# Patient Record
Sex: FEMALE | Race: WHITE | Employment: FULL TIME | URBAN - METROPOLITAN AREA
[De-identification: names, ages, dates, MRNs, and addresses within clinical notes are randomized per-mention and may not be internally consistent; named-entity substitution may affect disease eponyms.]

---

## 2017-08-16 ENCOUNTER — OFFICE VISIT (OUTPATIENT)
Dept: PEDIATRICS | Facility: CLINIC | Age: 32
End: 2017-08-16
Payer: COMMERCIAL

## 2017-08-16 VITALS
HEIGHT: 67 IN | SYSTOLIC BLOOD PRESSURE: 86 MMHG | HEART RATE: 80 BPM | OXYGEN SATURATION: 100 % | TEMPERATURE: 97 F | DIASTOLIC BLOOD PRESSURE: 50 MMHG | WEIGHT: 120.6 LBS | BODY MASS INDEX: 18.93 KG/M2

## 2017-08-16 DIAGNOSIS — Z32.01 PREGNANCY TEST POSITIVE: ICD-10-CM

## 2017-08-16 DIAGNOSIS — N91.2 ABSENCE OF MENSTRUATION: Primary | ICD-10-CM

## 2017-08-16 LAB — BETA HCG QUAL IFA URINE: POSITIVE

## 2017-08-16 PROCEDURE — 99213 OFFICE O/P EST LOW 20 MIN: CPT | Performed by: FAMILY MEDICINE

## 2017-08-16 PROCEDURE — 84703 CHORIONIC GONADOTROPIN ASSAY: CPT | Performed by: FAMILY MEDICINE

## 2017-08-16 ASSESSMENT — PAIN SCALES - GENERAL: PAINLEVEL: NO PAIN (0)

## 2017-08-16 NOTE — NURSING NOTE
"Chief Complaint   Patient presents with     Confirmation Of Pregnancy       Initial BP (!) 86/50  Pulse 80  Temp 97  F (36.1  C) (Oral)  Ht 5' 6.75\" (1.695 m)  Wt 120 lb 9.6 oz (54.7 kg)  LMP 06/03/2017 (Exact Date)  SpO2 100%  BMI 19.03 kg/m2 Estimated body mass index is 19.03 kg/(m^2) as calculated from the following:    Height as of this encounter: 5' 6.75\" (1.695 m).    Weight as of this encounter: 120 lb 9.6 oz (54.7 kg).  BP completed using cuff size: marzena Lorenzana CMA    "

## 2017-08-16 NOTE — MR AVS SNAPSHOT
After Visit Summary   8/16/2017    Marcy Diaz    MRN: [id number]           Patient Information     Date Of Birth          1985        Visit Information        Provider Department      8/16/2017 6:20 PM Terra Yeung MD [facility]        Today's Diagnoses     Absence of menstruation    -  1    Pregnancy test positive           Follow-ups after your visit        Who to contact     If you have questions or need follow up information about today's clinic visit or your schedule please contact [facility] directly at [phone].  Normal or non-critical lab and imaging results will be communicated to you by [employer], letter or phone within 4 business days after the clinic has received the results. If you do not hear from us within 7 days, please contact the clinic through [employer] or phone. If you have a critical or abnormal lab result, we will notify you by phone as soon as possible.  Submit refill requests through [employer] or call your pharmacy and they will forward the refill request to us. Please allow 3 business days for your refill to be completed.          Additional Information About Your Visit        MyChart Information     [employer] gives you secure access to your electronic health record. If you see a primary care provider, you can also send messages to your care team and make appointments. If you have questions, please call your primary care clinic.  If you do not have a primary care provider, please call [phone] and they will assist you.      [employer] is an electronic gateway that provides easy, online access to your medical records. With [employer], you can request a clinic appointment, read your test results, renew a prescription or communicate with your care team.     To access your existing account, please contact your [facility] or call [phone] for assistance.        Care EveryWhere ID     This is your Care  "EveryWhere ID. This could be used by other organizations to access your [place] medical records  [id number]        Your Vitals Were     Pulse Temperature Height Last Period Pulse Oximetry BMI (Body Mass Index)    80 97  F (36.1  C) (Oral) 5' 6.75\" (1.695 m) 06/03/2017 (Exact Date) 100% 19.03 kg/m2       Blood Pressure from Last 3 Encounters:   08/16/17 (!) 86/50   12/11/15 106/71   10/30/15 108/69    Weight from Last 3 Encounters:   08/16/17 120 lb 9.6 oz (54.7 kg)   12/11/15 133 lb (60.3 kg)   10/30/15 154 lb (69.9 kg)              We Performed the Following     Beta HCG qual IFA urine        Primary Care Provider Office Phone # Fax #    Neelam Cantrellrula Ahmadi, APRN [phone]       [address]  [place] [address]        Equal Access to Services     [facility]: Hadii aad ku hadasho Soomaali, waaxda luqadaha, qaybta kaalmada adeegyada, waxay idiin hayaan annabelle kramer'harshad . So [facility] [phone].    ATENCIÓN: Si habla español, tiene a bro disposición servicios gratuitos de asistencia lingüística. Llame al [phone].    We comply with applicable federal civil rights laws and [place] laws. We do not discriminate on the basis of race, color, national origin, age, disability sex, sexual orientation or gender identity.            Thank you!     Thank you for choosing [facility]  for your care. Our goal is always to provide you with excellent care. Hearing back from our patients is one way we can continue to improve our services. Please take a few minutes to complete the written survey that you may receive in the mail after your visit with us. Thank you!             Your Updated Medication List - Protect others around you: Learn how to safely use, store and throw away your medicines at www.disposemymeds.org.          This list is accurate as of: 8/16/17  6:38 PM.  Always use your most recent med list.                   Brand Name Dispense Instructions for use " Diagnosis    PRENATAL PO      Take 1 tablet by mouth 3 times daily

## 2017-08-16 NOTE — PROGRESS NOTES
SUBJECTIVE:                                                    Marcy Diaz is a 32 year old female who presents to clinic today for the following health issues:      Confirmation of pregnancy-LMP 6/3/17 and positive home pregnancy test    Patient is  2 para 1, here to confirm pregnancy. LMP-Sindhu 3, 2017.  Has history of regular menstrual cycles  Has no urinary frequency, abdominal pain, breast fullness or tenderness, abnormal vaginal discharge or bleeding, nausea, vomiting.          Problem list and histories reviewed & adjusted, as indicated.  Additional history: as documented    Patient Active Problem List   Diagnosis     Acne     CARDIOVASCULAR SCREENING; LDL GOAL LESS THAN 160     Past Surgical History:   Procedure Laterality Date     EXTRACTION(S) DENTAL       EYE SURGERY   &     strabismus correction       Social History   Substance Use Topics     Smoking status: Never Smoker     Smokeless tobacco: Never Used     Alcohol use Yes      Comment: 2 beers a week, not in PG     Family History   Problem Relation Age of Onset     C.A.D. Maternal Grandfather      DIABETES Maternal Grandfather      Hypertension Maternal Grandfather      Prostate Cancer Maternal Grandfather      C.A.D. Paternal Grandfather      DIABETES Paternal Grandfather      Hypertension Paternal Grandfather      Hypertension Mother      Thyroid Disease Mother      hypothyroidism     Breast Cancer Paternal Grandmother      Alcohol/Drug Father      alcohol     CANCER Father      skin     Asthma No family hx of      CEREBROVASCULAR DISEASE No family hx of      Cancer - colorectal No family hx of      Lipids No family hx of      Depression No family hx of          Current Outpatient Prescriptions   Medication Sig Dispense Refill     Prenatal Vit-Fe Fumarate-FA (PRENATAL PO) Take 1 tablet by mouth 3 times daily        No Known Allergies  Recent Labs   Lab Test  13   1725   LDL  114   HDL  56   TRIG  77      BP Readings from Last  "3 Encounters:   08/16/17 (!) 86/50   12/11/15 106/71   10/30/15 108/69    Wt Readings from Last 3 Encounters:   08/16/17 120 lb 9.6 oz (54.7 kg)   12/11/15 133 lb (60.3 kg)   10/30/15 154 lb (69.9 kg)                  Labs reviewed in EPIC      C: NEGATIVE for fever, chills, change in weight  R: NEGATIVE for significant cough or SOB  CV: NEGATIVE for chest pain, palpitations or peripheral edema  GI: NEGATIVE for nausea, abdominal pain, heartburn, or change in bowel habits  : as above  MUSCULOSKELETAL: NEGATIVE for significant arthralgias or myalgia  NEURO: NEGATIVE for weakness, dizziness or paresthesias  ENDOCRINE: NEGATIVE for temperature intolerance, skin/hair changes  PSYCHIATRIC: NEGATIVE for changes in mood or affect    ROS:      OBJECTIVE:     BP (!) 86/50  Pulse 80  Temp 97  F (36.1  C) (Oral)  Ht 5' 6.75\" (1.695 m)  Wt 120 lb 9.6 oz (54.7 kg)  LMP 06/03/2017 (Exact Date)  SpO2 100%  BMI 19.03 kg/m2  Body mass index is 19.03 kg/(m^2).  GENERAL: healthy, alert and no distress  NECK: no adenopathy, no asymmetry, masses, or scars and thyroid normal to palpation  RESP: lungs clear to auscultation - no rales, rhonchi or wheezes  CV: regular rate and rhythm, normal S1 S2, no S3 or S4, no murmur, click or rub, no peripheral edema and peripheral pulses strong  ABDOMEN: soft, nontender, no hepatosplenomegaly, no masses and bowel sounds normal  MS: no gross musculoskeletal defects noted, no edema  BACK: no CVA tenderness, no paralumbar tenderness  PSYCH: mentation appears normal, affect normal/bright    Diagnostic Test Results:  Results for orders placed or performed in visit on 08/16/17 (from the past 24 hour(s))   Beta HCG qual IFA urine   Result Value Ref Range    Beta HCG Qual IFA Urine Positive (A) NEG^Negative          ASSESSMENT/PLAN:             1. Absence of menstruation    - Beta HCG qual IFA urine    2. Pregnancy test positive  Results for orders placed or performed in visit on 08/16/17   Beta HCG " qual IFA urine   Result Value Ref Range    Beta HCG Qual IFA Urine Positive (A) NEG^Negative         reviewed positive results for urine pregnancy test with patient.  Recommended to continue with prenatal vitamins,   brief prenatal counseling given.  Patient will call to schedule for follow-up with Dr. Almonte for ongoing prenatal care      Chart documentation done in part with Dragon Voice recognition Software. Although reviewed after completion, some word and grammatical error may remain.  Chart forwarded to PCP for FYI     See Patient Instructions    Terra Yeung MD  [facility]

## 2017-08-28 ENCOUNTER — PRENATAL OFFICE VISIT (OUTPATIENT)
Dept: OBGYN | Facility: CLINIC | Age: 32
End: 2017-08-28
Payer: COMMERCIAL

## 2017-08-28 VITALS
HEIGHT: 67 IN | HEART RATE: 74 BPM | TEMPERATURE: 97.8 F | DIASTOLIC BLOOD PRESSURE: 64 MMHG | SYSTOLIC BLOOD PRESSURE: 103 MMHG | BODY MASS INDEX: 19.34 KG/M2 | OXYGEN SATURATION: 100 % | WEIGHT: 123.2 LBS

## 2017-08-28 DIAGNOSIS — Z34.80 PRENATAL CARE, SUBSEQUENT PREGNANCY, UNSPECIFIED TRIMESTER: Primary | ICD-10-CM

## 2017-08-28 LAB
ABO + RH BLD: NORMAL
ABO + RH BLD: NORMAL
BLD GP AB SCN SERPL QL: NORMAL
BLOOD BANK CMNT PATIENT-IMP: NORMAL
ERYTHROCYTE [DISTWIDTH] IN BLOOD BY AUTOMATED COUNT: 13.7 % (ref 10–15)
HCT VFR BLD AUTO: 37.3 % (ref 35–47)
HGB BLD-MCNC: 13 G/DL (ref 11.7–15.7)
MCH RBC QN AUTO: 31.6 PG (ref 26.5–33)
MCHC RBC AUTO-ENTMCNC: 34.9 G/DL (ref 31.5–36.5)
MCV RBC AUTO: 91 FL (ref 78–100)
PLATELET # BLD AUTO: 228 10E9/L (ref 150–450)
RBC # BLD AUTO: 4.12 10E12/L (ref 3.8–5.2)
SPECIMEN EXP DATE BLD: NORMAL
WBC # BLD AUTO: 8.5 10E9/L (ref 4–11)

## 2017-08-28 PROCEDURE — 87086 URINE CULTURE/COLONY COUNT: CPT | Performed by: OBSTETRICS & GYNECOLOGY

## 2017-08-28 PROCEDURE — 86780 TREPONEMA PALLIDUM: CPT | Performed by: OBSTETRICS & GYNECOLOGY

## 2017-08-28 PROCEDURE — 85027 COMPLETE CBC AUTOMATED: CPT | Performed by: OBSTETRICS & GYNECOLOGY

## 2017-08-28 PROCEDURE — 86850 RBC ANTIBODY SCREEN: CPT | Performed by: OBSTETRICS & GYNECOLOGY

## 2017-08-28 PROCEDURE — 86762 RUBELLA ANTIBODY: CPT | Performed by: OBSTETRICS & GYNECOLOGY

## 2017-08-28 PROCEDURE — 99207 ZZC FIRST OB VISIT: CPT | Performed by: OBSTETRICS & GYNECOLOGY

## 2017-08-28 PROCEDURE — 86901 BLOOD TYPING SEROLOGIC RH(D): CPT | Performed by: OBSTETRICS & GYNECOLOGY

## 2017-08-28 PROCEDURE — 36415 COLL VENOUS BLD VENIPUNCTURE: CPT | Performed by: OBSTETRICS & GYNECOLOGY

## 2017-08-28 PROCEDURE — 87340 HEPATITIS B SURFACE AG IA: CPT | Performed by: OBSTETRICS & GYNECOLOGY

## 2017-08-28 PROCEDURE — 87389 HIV-1 AG W/HIV-1&-2 AB AG IA: CPT | Performed by: OBSTETRICS & GYNECOLOGY

## 2017-08-28 PROCEDURE — 86900 BLOOD TYPING SEROLOGIC ABO: CPT | Performed by: OBSTETRICS & GYNECOLOGY

## 2017-08-28 NOTE — NURSING NOTE
"Chief Complaint   Patient presents with     Prenatal Care     1st OB       Initial /64  Pulse 74  Temp 97.8  F (36.6  C) (Oral)  Ht 1.694 m (5' 6.69\")  Wt 55.9 kg (123 lb 3.2 oz)  LMP 06/03/2017 (Exact Date)  SpO2 100%  BMI 19.47 kg/m2 Estimated body mass index is 19.47 kg/(m^2) as calculated from the following:    Height as of this encounter: 1.694 m (5' 6.69\").    Weight as of this encounter: 55.9 kg (123 lb 3.2 oz).  Medication Reconciliation: complete   Cecille Mcdonald CMA      "

## 2017-08-28 NOTE — PROGRESS NOTES
Marcy Diaz is a 32 year old year old G 2 P 1 who presents for an initial obstetrical visit.  Referred by self.    Currently experiencing normal pregnancy symptoms without particular problems including pain, bleeding, marked vomiting or weight loss except: no exceptions.  This was a planned pregnancy. Not nursing and had regular monthly menses.   Here today alone.   Additional concerns: some INGE, previous third degree laceration, paternal age of 50.     ROS:     Systems reviewed include constitutional; breast;                 cardiac; respiratory; gastrointestinal; genitourinary;                                musculoskeletal; integumentary; psychological;                                hematologic/lymphatic and endocrine.                  These systems were negative for significant symptoms except                 for the following: none and see above HPI.    Past medical, obstetrical, surgical, family and social history reviewed and as noted or updated in chart.     Allergies, meds and supplements are as noted or updated in chart.                    Episode OB dating, demographic and history forms completed or reviewed.    EXAM:  VS as noted. BMI- There is no height or weight on file to calculate BMI.    Relatively recent normal general exam- not repeated now.       ASSESSMENT: (Z34.80) Prenatal care, subsequent pregnancy, unspecified trimester  (primary encounter diagnosis)  Comment:   Plan: CBC with Platelets, HIV Antigen Antibody Combo,        Rubella Antibody IgG Quantitative, Hepatitis B         surface antigen, Anti Treponema, ABO/RH Type         and Screen, Urine Culture Aerobic Bacterial               PLAN:  Advice appropriate to gestational age reviewed including pertinent Checklist items. Discussed condition, tests and general care plan. Symptoms, problems and concerns reviewed. Recommended weight gain discussed. Problem list initiated. Pap due in 1 yr. Orders as noted. RTC in 4 weeks.   Discussed  special diagnostic and screening tests and plans (y = yes, n = no/declined, u = uncertain/considering): quad scr = y, survey u/s = y, Level 2 survey u/s with MFM = n, CF carrier scr = n, hemoglobinopathy or thalassemia scr= n, SMA, fragile X  and other genetic scr= n, 1st trimester scr with NT and later AFP or with cell free fetal DNA and later AFP = n, cell free fetal DNA= n, genetic amnio/CVS = n.    Rashel Almonte MD

## 2017-08-28 NOTE — MR AVS SNAPSHOT
After Visit Summary   8/28/2017    Marcy Diaz    MRN: [id number]           Patient Information     Date Of Birth          1985        Visit Information        Provider Department      8/28/2017 3:15 PM Rashel Almonte MD [facility]        Today's Diagnoses     Prenatal care, subsequent pregnancy, unspecified trimester    -  1       Follow-ups after your visit        Your next 10 appointments already scheduled     Sep 25, 2017  4:00 PM CDT   ESTABLISHED PRENATAL with Rashel Almonte MD   [facility] ([facility])    [address] [place] [id number] [phone]              Who to contact     If you have questions or need follow up information about today's clinic visit or your schedule please contact [facility] directly at [phone].  Normal or non-critical lab and imaging results will be communicated to you by MyChart, letter or phone within 4 business days after the clinic has received the results. If you do not hear from us within 7 days, please contact the clinic through MyChart or phone. If you have a critical or abnormal lab result, we will notify you by phone as soon as possible.  Submit refill requests through [employer] or call your pharmacy and they will forward the refill request to us. Please allow 3 business days for your refill to be completed.          Additional Information About Your Visit        MyChart Information     [employer] gives you secure access to your electronic health record. If you see a primary care provider, you can also send messages to your care team and make appointments. If you have questions, please call your primary care clinic.  If you do not have a primary care provider, please call [phone] and they will assist you.        Care EveryWhere ID     This is your Care EveryWhere ID. This could be used by other organizations to access your [employer]  "records  [id number]        Your Vitals Were     Pulse Temperature Height Last Period Pulse Oximetry BMI (Body Mass Index)    74 97.8  F (36.6  C) (Oral) 1.694 m (5' 6.69\") 06/03/2017 (Exact Date) 100% 19.47 kg/m2       Blood Pressure from Last 3 Encounters:   08/28/17 103/64   08/16/17 (!) 86/50   12/11/15 106/71    Weight from Last 3 Encounters:   08/28/17 55.9 kg (123 lb 3.2 oz)   08/16/17 54.7 kg (120 lb 9.6 oz)   12/11/15 60.3 kg (133 lb)              We Performed the Following     ABO/RH Type and Screen     Anti Treponema     CBC with Platelets     Hepatitis B surface antigen     HIV Antigen Antibody Combo     Rubella Antibody IgG Quantitative     Urine Culture Aerobic Bacterial        Primary Care Provider Office Phone # Fax #    Neelam Joi Ahmadi, APRN [facility] [phone]       [address]  [place] [address]        Equal Access to Services     [facility]: Hadii aad ku hadasho Soomaali, waaxda luqadaha, qaybta kaalmada adeegyada, viviana fletcher . So [facility] [phone].    ATENCIÓN: Si habla español, tiene a bro disposición servicios gratuitos de asistencia lingüística. Llame al [phone].    We comply with applicable federal civil rights laws and [place] laws. We do not discriminate on the basis of race, color, national origin, age, disability sex, sexual orientation or gender identity.            Thank you!     Thank you for choosing [facility]  for your care. Our goal is always to provide you with excellent care. Hearing back from our patients is one way we can continue to improve our services. Please take a few minutes to complete the written survey that you may receive in the mail after your visit with us. Thank you!             Your Updated Medication List - Protect others around you: Learn how to safely use, store and throw away your medicines at www.disposemymeds.org.          This list is accurate as of: 8/28/17  3:20 PM.  " Always use your most recent med list.                   Brand Name Dispense Instructions for use Diagnosis    PRENATAL PO      Take 1 tablet by mouth 3 times daily

## 2017-08-29 LAB
BACTERIA SPEC CULT: NORMAL
HBV SURFACE AG SERPL QL IA: NONREACTIVE
HIV 1+2 AB+HIV1 P24 AG SERPL QL IA: NONREACTIVE
RUBV IGG SERPL IA-ACNC: 13 IU/ML
SPECIMEN SOURCE: NORMAL
T PALLIDUM IGG+IGM SER QL: NEGATIVE

## 2017-09-15 ENCOUNTER — TELEPHONE (OUTPATIENT)
Dept: OBGYN | Facility: CLINIC | Age: 32
End: 2017-09-15

## 2017-09-15 DIAGNOSIS — Z34.80 SUPERVISION OF OTHER NORMAL PREGNANCY, ANTEPARTUM: ICD-10-CM

## 2017-09-15 NOTE — TELEPHONE ENCOUNTER
Patient called with concerns of a mild headache.  She typically doesn't get headaches.  She had a headache yesterday which did resolve.  Today she has another headache.  She owns an automatic  blood pressure cuff and her blood pressure is morning is 110/60.  She stated she is drinking water.  I advised she increase her fluid intake to 100 ml daily if not more.  She is eating well.  She denied vaginal bleeding or cramping and has no vision concerns.  I advised Tylenol as needed for the headache pain as this is safe in pregnancy.  Patient has the number to our clinic and will call back if symptoms persist or worsen.  Chela Palomino RN

## 2017-09-25 ENCOUNTER — PRENATAL OFFICE VISIT (OUTPATIENT)
Dept: OBGYN | Facility: CLINIC | Age: 32
End: 2017-09-25
Payer: COMMERCIAL

## 2017-09-25 VITALS
HEART RATE: 66 BPM | WEIGHT: 125 LBS | DIASTOLIC BLOOD PRESSURE: 63 MMHG | BODY MASS INDEX: 19.76 KG/M2 | OXYGEN SATURATION: 100 % | SYSTOLIC BLOOD PRESSURE: 95 MMHG

## 2017-09-25 DIAGNOSIS — Z34.80 SUPERVISION OF OTHER NORMAL PREGNANCY, ANTEPARTUM: Primary | ICD-10-CM

## 2017-09-25 PROCEDURE — 99207 ZZC PRENATAL VISIT: CPT | Performed by: OBSTETRICS & GYNECOLOGY

## 2017-09-25 PROCEDURE — 81511 FTL CGEN ABNOR FOUR ANAL: CPT | Mod: 90 | Performed by: OBSTETRICS & GYNECOLOGY

## 2017-09-25 PROCEDURE — 99000 SPECIMEN HANDLING OFFICE-LAB: CPT | Performed by: OBSTETRICS & GYNECOLOGY

## 2017-09-25 PROCEDURE — 36415 COLL VENOUS BLD VENIPUNCTURE: CPT | Performed by: OBSTETRICS & GYNECOLOGY

## 2017-09-25 NOTE — NURSING NOTE
"Chief Complaint   Patient presents with     Prenatal Care     16.2 weeks       Initial BP 95/63 (BP Location: Right arm, Cuff Size: Adult Regular)  Pulse 66  Wt 125 lb (56.7 kg)  LMP 06/03/2017 (Exact Date)  SpO2 100%  BMI 19.76 kg/m2 Estimated body mass index is 19.76 kg/(m^2) as calculated from the following:    Height as of 8/28/17: 5' 6.69\" (1.694 m).    Weight as of this encounter: 125 lb (56.7 kg).  Medication Reconciliation: complete   YANELY Alexandre 9/25/2017         "

## 2017-09-25 NOTE — MR AVS SNAPSHOT
"              After Visit Summary   9/25/2017    Marcy Diaz    MRN: [id number]           Patient Information     Date Of Birth          1985        Visit Information        Provider Department      9/25/2017 4:00 PM Rashel Almonte MD [facility]        Today's Diagnoses     Supervision of other normal pregnancy, antepartum    -  1       Follow-ups after your visit        Follow-up notes from your care team     Return in about 4 weeks (around 10/23/2017).      Future tests that were ordered for you today     Open Future Orders        Priority Expected Expires Ordered    US OB > 14 weeks, COMPLETE, Single (Fetal Survey) (GZL023) Routine  12/24/2017 9/25/2017    Maternal Quad Screen Routine  3/24/2018 9/25/2017            Who to contact     If you have questions or need follow up information about today's clinic visit or your schedule please contact [facility] directly at [phone].  Normal or non-critical lab and imaging results will be communicated to you by MyChart, letter or phone within 4 business days after the clinic has received the results. If you do not hear from us within 7 days, please contact the clinic through [employer] or phone. If you have a critical or abnormal lab result, we will notify you by phone as soon as possible.  Submit refill requests through [employer] or call your pharmacy and they will forward the refill request to us. Please allow 3 business days for your refill to be completed.          Additional Information About Your Visit        [employer] Information     [employer] lets you send messages to your doctor, view your test results, renew your prescriptions, schedule appointments and more. To sign up, go to [email or web address] . Click on \"Log in\" on the left side of the screen, which will take you to the Welcome page. Then click on \"Sign up Now\" on the right side of the page.     You will be asked to enter the access code listed below, as well " as some personal information. Please follow the directions to create your username and password.     Your access code is: [email or web address]  Expires: 2017  4:02 PM     Your access code will  in 90 days. If you need help or a new code, please call your [place] clinic or [phone].        Care EveryWhere ID     This is your Care EveryWhere ID. This could be used by other organizations to access your [place] medical records  [id number]        Your Vitals Were     Pulse Last Period Pulse Oximetry BMI (Body Mass Index)          66 2017 (Exact Date) 100% 19.76 kg/m2         Blood Pressure from Last 3 Encounters:   17 95/63   17 103/64   17 (!) 86/50    Weight from Last 3 Encounters:   17 125 lb (56.7 kg)   17 123 lb 3.2 oz (55.9 kg)   17 120 lb 9.6 oz (54.7 kg)               Primary Care Provider Office Phone # Fax #    Neelam Cantrellrula Ahmadi, APRN [facility] [phone]       [address]  [place] [address]        Equal Access to Services     [facility]: Hadii aad ku hadasho Soomaali, waaxda luqadaha, qaybta kaalmada adeegyada, waxay idiin hayignacion annabelle fletcher . So [facility] [phone].    ATENCIÓN: Si habla español, tiene a bro disposición servicios gratuitos de asistencia lingüística. Llame al [phone].    We comply with applicable federal civil rights laws and [place] laws. We do not discriminate on the basis of race, color, national origin, age, disability sex, sexual orientation or gender identity.            Thank you!     Thank you for choosing [facility]  for your care. Our goal is always to provide you with excellent care. Hearing back from our patients is one way we can continue to improve our services. Please take a few minutes to complete the written survey that you may receive in the mail after your visit with us. Thank you!             Your Updated Medication List - Protect others around you: Learn how  to safely use, store and throw away your medicines at www.disposemymeds.org.          This list is accurate as of: 9/25/17  4:29 PM.  Always use your most recent med list.                   Brand Name Dispense Instructions for use Diagnosis    PRENATAL PO      Take 1 tablet by mouth 3 times daily

## 2017-09-26 NOTE — PROGRESS NOTES
No signif signs, symptoms or concerns except some fatigue and occasional headache. Advice appropriate to gestational age reviewed including pertinent Checklist items. Discussed condition, tests and care plan including RBA. Problem list updated. Survey u/s next.  A/P:  Marcy was seen today for prenatal care.    Diagnoses and all orders for this visit:    Supervision of other normal pregnancy, antepartum  -     US OB > 14 weeks, COMPLETE, Single (Fetal Survey) (USD272); Future  -     Maternal Quad Screen; Future  -     Maternal Quad Screen        Rashel Almonte MD

## 2017-09-28 LAB
# FETUSES US: NORMAL
AFP ADJ MOM AMN: 0.84
AFP SERPL-MCNC: 32 NG/ML
AGE - REPORTED: 33.9 YR
DATING METHOD: NORMAL
DIABETIC AT CONCEPTION: NO
FAMILY MEMBER DISEASES HX: NO
FAMILY MEMBER DISEASES HX: NO
GA METHOD: NORMAL
GA: 16.29 WEEKS
HCG MOM SERPL: 0.77
HCG SERPL-ACNC: NORMAL IU/L
HX OF HEREDITARY DISORDERS: NO
IDDM PATIENT QL: NO
INHIBIN A MOM SERPL: 0.5
INHIBIN A SERPL-MCNC: 89 PG/ML
INTEGRATED SCN PATIENT-IMP: NORMAL
LMP START DATE: NORMAL
PATHOLOGY STUDY: NORMAL
PREV HX CHROMOSOME ABNORMALITY: NO
SPECIMEN DRAWN SERPL: NORMAL
TWINS: NORMAL
U ESTRIOL MOM SERPL: 1.17
U ESTRIOL SERPL-MCNC: 1.17 NG/ML

## 2017-10-20 ENCOUNTER — TELEPHONE (OUTPATIENT)
Dept: OBGYN | Facility: CLINIC | Age: 32
End: 2017-10-20

## 2017-10-20 ENCOUNTER — RADIANT APPOINTMENT (OUTPATIENT)
Dept: ULTRASOUND IMAGING | Facility: CLINIC | Age: 32
End: 2017-10-20
Attending: OBSTETRICS & GYNECOLOGY
Payer: COMMERCIAL

## 2017-10-20 DIAGNOSIS — Z34.80 SUPERVISION OF OTHER NORMAL PREGNANCY, ANTEPARTUM: ICD-10-CM

## 2017-10-20 PROBLEM — O44.40 LOW-LYING PLACENTA: Status: ACTIVE | Noted: 2017-10-20

## 2017-10-20 PROCEDURE — 76805 OB US >/= 14 WKS SNGL FETUS: CPT | Performed by: RADIOLOGY

## 2017-10-20 NOTE — TELEPHONE ENCOUNTER
Phone call to patient and gave results per Dr. Almonte as below. Patient verbalized understanding and agreed to follow plan. Patient appreciative of follow up call.Phoebe Joshi RN, BAN

## 2017-10-20 NOTE — TELEPHONE ENCOUNTER
Notes Recorded by Rashel Almonte MD on 10/20/2017 at 4:06 PM  Ok- normal, stable and reassuring except the placenta is low-lying and this may be more susceptible to bleeding, but as the pregnancy progresses, the placenta should be farther away from the cervical opening and this should resolve. Recommend pelvic rest for now by avoiding sexual intercourse or vigorous exercise. Will plan follow-up for this at approximately 28 weeks gestation. Please notify patient.    This writer attempted to contact Marcy on 10/20/17      Reason for call information from the doctor and left message to return call.      If patient calls back:   Patient contacted by clinic RN team. Inform patient that someone from the team will contact them, document that pt called and route to care team. .        Phoebe Joshi RN

## 2017-10-23 ENCOUNTER — PRENATAL OFFICE VISIT (OUTPATIENT)
Dept: OBGYN | Facility: CLINIC | Age: 32
End: 2017-10-23
Payer: COMMERCIAL

## 2017-10-23 VITALS
HEART RATE: 76 BPM | SYSTOLIC BLOOD PRESSURE: 94 MMHG | WEIGHT: 130 LBS | DIASTOLIC BLOOD PRESSURE: 64 MMHG | TEMPERATURE: 97.8 F | BODY MASS INDEX: 20.55 KG/M2

## 2017-10-23 DIAGNOSIS — Z34.80 SUPERVISION OF OTHER NORMAL PREGNANCY, ANTEPARTUM: ICD-10-CM

## 2017-10-23 DIAGNOSIS — O44.40 LOW-LYING PLACENTA: ICD-10-CM

## 2017-10-23 PROCEDURE — 99207 ZZC PRENATAL VISIT: CPT | Performed by: OBSTETRICS & GYNECOLOGY

## 2017-10-23 NOTE — PATIENT INSTRUCTIONS
If you have any questions regarding your visit, Please contact your care team.     [facility]: [phone]    [facility] CLINIC HOURS TELEPHONE NUMBER   GAGE Adams-    Wali Sandhu-VALENTINE Mae-Medical Assistant   Monday-[place]  8:00a.m-4:45 p.m  Wednesday-[place] 8:00a.m-4:45 p.m.  Thursday-[place]  8:00a.m-4:45 p.m.  Friday-[place]  8:00a.m-4:45 p.m. [facility]  [address]  [place], [place] [address] [phone] ask [facility]  [phone] Fax  Imaging [id number]-[phone]    [facility] Labor and Delivery  [address] [place] [address] Dr.  [place], [place] [address] [phone]    [facility]  [address] [place] [address]  [phone] ask [facility]  [phone] Fax  Imaging [phone]     Urgent Care locations:    [place]        [place] Monday-Friday  5 pm - 9 pm  Saturday and Sunday   9 am - 5 pm    Monday-Friday   11 am - 9 pm  Saturday and Sunday   9 am - 5 pm   [phone]       If you need a medication refill, please contact your pharmacy. Please allow 3 business days for your refill to be completed.  As always, Thank you for trusting us with your healthcare needs!

## 2017-10-23 NOTE — NURSING NOTE
"Chief Complaint   Patient presents with     Prenatal Care     OBV 20w2d       Initial BP 94/64 (BP Location: Right arm, Patient Position: Chair, Cuff Size: Adult Regular)  Pulse 76  Temp 97.8  F (36.6  C) (Oral)  Wt 59 kg (130 lb)  LMP 06/03/2017 (Exact Date)  Breastfeeding? No  BMI 20.55 kg/m2 Estimated body mass index is 20.55 kg/(m^2) as calculated from the following:    Height as of 8/28/17: 1.694 m (5' 6.69\").    Weight as of this encounter: 59 kg (130 lb).  Medication Reconciliation: complete   Carmelita Dela Cruz CMA      "

## 2017-10-23 NOTE — MR AVS SNAPSHOT
After Visit Summary   10/23/2017    Marcy Diaz    MRN: [id number]           Patient Information     Date Of Birth          1985        Visit Information        Provider Department      10/23/2017 3:45 PM Rashel Almonte MD [facility]        Today's Diagnoses     Low-lying placenta        Supervision of other normal pregnancy, antepartum          Care Instructions                                                        If you have any questions regarding your visit, Please contact your care team.     [facility] Access Services: [phone]    [facility] CLINIC HOURS TELEPHONE NUMBER   Rashel Almonte M.D.      Precious-    Wali Sandhu-VALENTINE Mae-Medical Assistant   Monday-[place]  8:00a.m-4:45 p.m  Wednesday-[place] 8:00a.m-4:45 p.m.  Thursday-[place]  8:00a.m-4:45 p.m.  Friday-[place]  8:00a.m-4:45 p.m. [facility]  [address]  [place], [place] [address] [phone] ask [facility]  [phone] Fax  Imaging [phone]    [facility] Labor and Delivery  [address]  [place], [place] [address] [phone]    [facility]  [address] [place] [address]  [phone] ask [facility]  [phone] Fax  Imaging [phone]     Urgent Care locations:    [facility] Monday-Friday  5 pm - 9 pm  Saturday and Sunday   9 am - 5 pm    Monday-Friday   11 am - 9 pm  Saturday and Sunday   9 am - 5 pm   [phone]       If you need a medication refill, please contact your pharmacy. Please allow 3 business days for your refill to be completed.  As always, Thank you for trusting us with your healthcare needs!            Follow-ups after your visit        Who to contact     If you have questions or need follow up information about today's clinic visit or your schedule please contact [facility]  "directly at [phone].  Normal or non-critical lab and imaging results will be communicated to you by GoodLux Technologyhart, letter or phone within 4 business days after the clinic has received the results. If you do not hear from us within 7 days, please contact the clinic through GoodL[employer] Technologyhart or phone. If you have a critical or abnormal lab result, we will notify you by phone as soon as possible.  Submit refill requests through elicit or call your pharmacy and they will forward the refill request to us. Please allow 3 business days for your refill to be completed.          Additional Information About Your Visit        GoodLux Technologyhart Information     elicit lets you send messages to your doctor, view your test results, renew your prescriptions, schedule appointments and more. To sign up, go to [email or web address] . Click on \"Log in\" on the left side of the screen, which will take you to the Welcome page. Then click on \"Sign up Now\" on the right side of the page.     You will be asked to enter the access code listed below, as well as some personal information. Please follow the directions to create your username and password.     Your access code is: [email or web address]  Expires: 2018  3:58 PM     Your access code will  in 90 days. If you need help or a new code, please call your [place] clinic or [phone].        Care EveryWhere ID     This is your Care EveryWhere ID. This could be used by other organizations to access your [place] medical records  [id number]        Your Vitals Were     Pulse Temperature Last Period Breastfeeding? BMI (Body Mass Index)       76 97.8  F (36.6  C) (Oral) 2017 (Exact Date) No 20.55 kg/m2        Blood Pressure from Last 3 Encounters:   10/23/17 94/64   17 95/63   17 103/64    Weight from Last 3 Encounters:   10/23/17 59 kg (130 lb)   17 56.7 kg (125 lb)   17 55.9 kg (123 lb 3.2 oz)              Today, you had the following     No orders found for display       " Primary Care Provider Office Phone # Fax #    Neelam Ahmadi, APRN [facility] [phone]       [address]  [place] [address]        Equal Access to Services     MARIELY HOBBS : Hadii aad ku hadbartolomeo Soomaali, waaxda luqadaha, qaybta kaalmada adeegyada, waxniecy idiin sudhan koltondayday barriosslick mcdaniel. So [facility] [phone].    ATENCIÓN: Si habla español, tiene a bro disposición servicios gratuitos de asistencia lingüística. Llame al [phone].    We comply with applicable federal civil rights laws and [place] laws. We do not discriminate on the basis of race, color, national origin, age, disability, sex, sexual orientation, or gender identity.            Thank you!     Thank you for choosing [facility]  for your care. Our goal is always to provide you with excellent care. Hearing back from our patients is one way we can continue to improve our services. Please take a few minutes to complete the written survey that you may receive in the mail after your visit with us. Thank you!             Your Updated Medication List - Protect others around you: Learn how to safely use, store and throw away your medicines at www.disposemymeds.org.          This list is accurate as of: 10/23/17  3:58 PM.  Always use your most recent med list.                   Brand Name Dispense Instructions for use Diagnosis    PRENATAL PO      Take 1 tablet by mouth 3 times daily

## 2017-10-24 NOTE — PROGRESS NOTES
No signif signs, symptoms or concerns except marginal previa. Advice appropriate to gestational age reviewed including pertinent Checklist items. Discussed condition, tests and care plan including RBA. Problem list updated. Possible 1h GTT next.  A/P:  Marcy was seen today for prenatal care.    Diagnoses and all orders for this visit:    Low-lying placenta    Supervision of other normal pregnancy, antepartum        Rashel Almonte MD

## 2017-11-17 ENCOUNTER — PRENATAL OFFICE VISIT (OUTPATIENT)
Dept: OBGYN | Facility: CLINIC | Age: 32
End: 2017-11-17
Payer: COMMERCIAL

## 2017-11-17 VITALS
DIASTOLIC BLOOD PRESSURE: 57 MMHG | WEIGHT: 134 LBS | HEART RATE: 78 BPM | TEMPERATURE: 96.8 F | SYSTOLIC BLOOD PRESSURE: 99 MMHG | BODY MASS INDEX: 21.18 KG/M2

## 2017-11-17 DIAGNOSIS — Z34.80 SUPERVISION OF OTHER NORMAL PREGNANCY, ANTEPARTUM: ICD-10-CM

## 2017-11-17 DIAGNOSIS — O44.40 LOW-LYING PLACENTA: ICD-10-CM

## 2017-11-17 PROCEDURE — 99207 ZZC PRENATAL VISIT: CPT | Performed by: OBSTETRICS & GYNECOLOGY

## 2017-11-17 RX ORDER — PRENATAL VIT/IRON FUM/FOLIC AC 27MG-0.8MG
1 TABLET ORAL DAILY
COMMUNITY
End: 2019-08-08

## 2017-11-17 NOTE — PROGRESS NOTES
No signif signs, symptoms or concerns. Had flu vac already. Advice appropriate to gestational age reviewed including pertinent Checklist items. Discussed condition, tests and care plan including RBA. Problem list updated. 1h GTT and follow-up u/s next.  A/P:  Mracy was seen today for prenatal care.    Diagnoses and all orders for this visit:    Low-lying placenta  -     US OB Limited One Or More Fetuses; Future    Supervision of other normal pregnancy, antepartum        Rashel Almonte MD

## 2017-11-17 NOTE — MR AVS SNAPSHOT
After Visit Summary   11/17/2017    Marcy Diaz    MRN: [id number]           Patient Information     Date Of Birth          1985        Visit Information        Provider Department      11/17/2017 9:30 AM Rashel Almonte MD [facility]        Today's Diagnoses     Low-lying placenta        Supervision of other normal pregnancy, antepartum          Care Instructions                                                        If you have any questions regarding your visit, Please contact your care team.     [facility] Access Services: [phone]    [facility] CLINIC HOURS TELEPHONE NUMBER   Rashel Almonte M.D.      Precious-    Letha Sandhu-VALENTINE Mae-Medical Assistant   Monday-[place]  8:00a.m-4:45 p.m  Wednesday-[place] 8:00a.m-4:45 p.m.  Thursday-[place]  8:00a.m-4:45 p.m.  Friday-[place]  8:00a.m-4:45 p.m. [facility]  [address] [place], [place] [address] [address].  [place], [place] [address] [phone] ask for [facility]  [phone] Fax  Imaging [phone]    [facility] Labor and Delivery  [address] [place] [address] Dr.  [place], [place] [address] [phone]    [facility]  [address] [place] [address]  [phone] ask [facility]  [phone] Fax  Imaging [phone]     Urgent Care locations:    [facility] Monday-Friday  5 pm - 9 pm  Saturday and Sunday   9 am - 5 pm    Monday-Friday   11 am - 9 pm  Saturday and Sunday   9 am - 5 pm   [phone]       If you need a medication refill, please contact your pharmacy. Please allow 3 business days for your refill to be completed.  As always, Thank you for trusting us with your healthcare needs!            Follow-ups after your visit        Who to contact     If you have questions or need follow up information about today's clinic visit or your schedule please contact [facility]  "directly at [phone].  Normal or non-critical lab and imaging results will be communicated to you by Benesighthart, letter or phone within 4 business days after the clinic has received the results. If you do not hear from us within 7 days, please contact the clinic through Benesighthart or phone. If you have a critical or abnormal lab result, we will notify you by phone as soon as possible.  Submit refill requests through [employer] or call your pharmacy and they will forward the refill request to us. Please allow 3 business days for your refill to be completed.          Additional Information About Your Visit        BenesightharActiViews Information     [employer] lets you send messages to your doctor, view your test results, renew your prescriptions, schedule appointments and more. To sign up, go to [email or web address] . Click on \"Log in\" on the left side of the screen, which will take you to the Welcome page. Then click on \"Sign up Now\" on the right side of the page.     You will be asked to enter the access code listed below, as well as some personal information. Please follow the directions to create your username and password.     Your access code is: [email or web address]  Expires: 2018  2:58 PM     Your access code will  in 90 days. If you need help or a new code, please call your [place] clinic or [phone].        Care EveryWhere ID     This is your Care EveryWhere ID. This could be used by other organizations to access your [place] medical records  [id number]        Your Vitals Were     Pulse Temperature Last Period Breastfeeding? BMI (Body Mass Index)       78 96.8  F (36  C) (Oral) 2017 (Exact Date) No 21.18 kg/m2        Blood Pressure from Last 3 Encounters:   17 99/57   10/23/17 94/64   17 95/63    Weight from Last 3 Encounters:   17 134 lb (60.8 kg)   10/23/17 130 lb (59 kg)   17 125 lb (56.7 kg)              Today, you had the following     No orders found for display         Today's " Medication Changes          These changes are accurate as of: 11/17/17  9:41 AM.  If you have any questions, ask your nurse or doctor.               These medicines have changed or have updated prescriptions.        Dose/Directions    prenatal multivitamin plus iron 27-0.8 MG Tabs per tablet   This may have changed:  Another medication with the same name was removed. Continue taking this medication, and follow the directions you see here.   Changed by:  Rashel Almonte MD        Dose:  1 tablet   Take 1 tablet by mouth daily   Refills:  0                Primary Care Provider Office Phone # Fax #    Neelam Ahmadi, APRN [facility] [phone]       [address]  [place] [address]        Equal Access to Services     GREGORY HOBBS : Hadii aad ku hadasho Sopham, waaxda luqadaha, qaybta kaalmada adeegyada, viviana fletcher . So [facility] [phone].    ATENCIÓN: Si habla español, tiene a bro disposición servicios gratuitos de asistencia lingüística. Llame al [phone].    We comply with applicable federal civil rights laws and [place] laws. We do not discriminate on the basis of race, color, national origin, age, disability, sex, sexual orientation, or gender identity.            Thank you!     Thank you for choosing [facility]  for your care. Our goal is always to provide you with excellent care. Hearing back from our patients is one way we can continue to improve our services. Please take a few minutes to complete the written survey that you may receive in the mail after your visit with us. Thank you!             Your Updated Medication List - Protect others around you: Learn how to safely use, store and throw away your medicines at www.disposemymeds.org.          This list is accurate as of: 11/17/17  9:41 AM.  Always use your most recent med list.                   Brand Name Dispense Instructions for use Diagnosis    prenatal multivitamin  plus iron 27-0.8 MG Tabs per tablet      Take 1 tablet by mouth daily

## 2017-11-17 NOTE — PATIENT INSTRUCTIONS
If you have any questions regarding your visit, Please contact your care team.     [facility] Access [facility]: [phone]    [facility] CLINIC HOURS TELEPHONE NUMBER   GAGE Adams-    Letha Sandhu-VALENTINE Mae-Medical Assistant   Monday-[place]  8:00a.m-4:45 p.m  Wednesday-[place] 8:00a.m-4:45 p.m.  Thursday-[place]  8:00a.m-4:45 p.m.  Friday-[place]  8:00a.m-4:45 p.m. [facility]  [address] [place] [address]  [phone] ask [facility]  [phone] Fax  Imaging [phone]    [facility] Labor and Delivery  [address] [place] [address] [place] [address]  [phone]    [facility]  [address] [place] [address]  [phone] [facility]  [phone] Fax  Imaging [phone]     Urgent Care locations:    [place]        [place] Monday-Friday  5 pm - 9 pm  Saturday and Sunday   9 am - 5 pm    Monday-Friday   11 am - 9 pm  Saturday and Sunday   9 am - 5 pm   [phone]       If you need a medication refill, please contact your pharmacy. Please allow 3 business days for your refill to be completed.  As always, Thank you for trusting us with your healthcare needs!

## 2017-11-17 NOTE — NURSING NOTE
"Chief Complaint   Patient presents with     Prenatal Care     OBV 23w6d       Initial BP 99/57 (BP Location: Left arm, Patient Position: Chair, Cuff Size: Adult Regular)  Pulse 78  Temp 96.8  F (36  C) (Oral)  Wt 134 lb (60.8 kg)  LMP 06/03/2017 (Exact Date)  Breastfeeding? No  BMI 21.18 kg/m2 Estimated body mass index is 21.18 kg/(m^2) as calculated from the following:    Height as of 8/28/17: 5' 6.69\" (1.694 m).    Weight as of this encounter: 134 lb (60.8 kg).  Medication Reconciliation: complete   Chetani SJ Dela Cruz      "

## 2017-12-03 ENCOUNTER — HEALTH MAINTENANCE LETTER (OUTPATIENT)
Age: 32
End: 2017-12-03

## 2017-12-22 ENCOUNTER — PRENATAL OFFICE VISIT (OUTPATIENT)
Dept: OBGYN | Facility: CLINIC | Age: 32
End: 2017-12-22
Payer: COMMERCIAL

## 2017-12-22 ENCOUNTER — RADIANT APPOINTMENT (OUTPATIENT)
Dept: ULTRASOUND IMAGING | Facility: CLINIC | Age: 32
End: 2017-12-22
Attending: OBSTETRICS & GYNECOLOGY
Payer: COMMERCIAL

## 2017-12-22 VITALS
WEIGHT: 138.6 LBS | BODY MASS INDEX: 21.75 KG/M2 | DIASTOLIC BLOOD PRESSURE: 60 MMHG | SYSTOLIC BLOOD PRESSURE: 97 MMHG | OXYGEN SATURATION: 100 % | HEIGHT: 67 IN | HEART RATE: 84 BPM

## 2017-12-22 DIAGNOSIS — O44.40 LOW-LYING PLACENTA: ICD-10-CM

## 2017-12-22 DIAGNOSIS — Z34.80 SUPERVISION OF OTHER NORMAL PREGNANCY, ANTEPARTUM: Primary | ICD-10-CM

## 2017-12-22 LAB
GLUCOSE 1H P 50 G GLC PO SERPL-MCNC: 159 MG/DL (ref 60–129)
HGB BLD-MCNC: 11.2 G/DL (ref 11.7–15.7)

## 2017-12-22 PROCEDURE — 99207 ZZC PRENATAL VISIT: CPT | Performed by: OBSTETRICS & GYNECOLOGY

## 2017-12-22 PROCEDURE — 85018 HEMOGLOBIN: CPT | Performed by: OBSTETRICS & GYNECOLOGY

## 2017-12-22 PROCEDURE — 36415 COLL VENOUS BLD VENIPUNCTURE: CPT | Performed by: OBSTETRICS & GYNECOLOGY

## 2017-12-22 PROCEDURE — 82950 GLUCOSE TEST: CPT | Performed by: OBSTETRICS & GYNECOLOGY

## 2017-12-22 PROCEDURE — 76815 OB US LIMITED FETUS(S): CPT

## 2017-12-22 ASSESSMENT — PAIN SCALES - GENERAL: PAINLEVEL: NO PAIN (0)

## 2017-12-22 NOTE — MR AVS SNAPSHOT
After Visit Summary   12/22/2017    Marcy Diaz    MRN: [id number]           Patient Information     Date Of Birth          1985        Visit Information        Provider Department      12/22/2017 3:30 PM Rashel Almonte MD [facility]        Today's Diagnoses     Supervision of other normal pregnancy, antepartum    -  1    Low-lying placenta          Care Instructions                                                        If you have any questions regarding your visit, Please contact your care team.     [facility] Access Services: [phone]    [facility] CLINIC HOURS TELEPHONE NUMBER       Rashel Almonte M.D.      Precious-      Ginny Mae-Medical Assistant       Monday-[place]  8:00a.m-4:45 p.m  Wednesday-[place] 8:00a.m-4:45 p.m.  Thursday-[place]  8:00a.m-4:45 p.m.  Friday-[place]  8:00a.m-4:45 p.m. [facility]  [address]  [place], [place] [address] [phone] ask [facility]  [phone] Fax  Imaging [phone]    [facility] Labor and Delivery  [address]  [place], [place] [address] [phone]    [facility]  [address] [facility] [place] [address] [phone] ask [facility]  [phone] Fax  Imaging [phone]     Urgent Care locations:    [facility] Monday-Friday  5 pm - 9 pm  Saturday and Sunday   9 am - 5 pm    Monday-Friday   11 am - 9 pm  Saturday and Sunday   9 am - 5 pm   [phone]       If you need a medication refill, please contact your pharmacy. Please allow 3 business days for your refill to be completed.  As always, Thank you for trusting us with your healthcare needs!            Follow-ups after your visit        Your next 10 appointments already scheduled     Jan 08, 2018  3:45 PM CST   ESTABLISHED PRENATAL with Rashel Almonte MD   [facility]  "([facility]    [address] [place] [address] [phone]              Who to contact     If you have questions or need follow up information about today's clinic visit or your schedule please contact [facility] directly at [phone].  Normal or non-critical lab and imaging results will be communicated to you by MyChart, letter or phone within 4 business days after the clinic has received the results. If you do not hear from us within 7 days, please contact the clinic through MyChart or phone. If you have a critical or abnormal lab result, we will notify you by phone as soon as possible.  Submit refill requests through [employer] or call your pharmacy and they will forward the refill request to us. Please allow 3 business days for your refill to be completed.          Additional Information About Your Visit        KannaLife Scienceshart Information     [employer] Source lets you send messages to your doctor, view your test results, renew your prescriptions, schedule appointments and more. To sign up, go to [email or web address] . Click on \"Log in\" on the left side of the screen, which will take you to the Welcome page. Then click on \"Sign up Now\" on the right side of the page.     You will be asked to enter the access code listed below, as well as some personal information. Please follow the directions to create your username and password.     Your access code is: [email or web address]  Expires: 2018  2:58 PM     Your access code will  in 90 days. If you need help or a new code, please call your [facility] or [phone].        Care EveryWhere ID     This is your Care EveryWhere ID. This could be used by other organizations to access your [place] medical records  [id number]        Your Vitals Were     Pulse Height Last Period Pulse Oximetry BMI (Body Mass Index)       84 5' 6.69\" (1.694 m) 2017 (Exact Date) 100% 21.91 kg/m2        Blood Pressure from Last 3 " Encounters:   12/22/17 97/60   11/17/17 99/57   10/23/17 94/64    Weight from Last 3 Encounters:   12/22/17 138 lb 9.6 oz (62.9 kg)   11/17/17 134 lb (60.8 kg)   10/23/17 130 lb (59 kg)              We Performed the Following     Glucose tolerance gest screen 1 hour     Hemoglobin        Primary Care Provider Office Phone # Fax #    Neelam Ahmadi, APRN [facility] [phone]       [address]  [place] [address]        Equal Access to Services     [facility]: Hadii aad ku hadasho Soomaali, waaxda luqadaha, qaybta kaalmada adedaydayyada, viviana fletcher . So [facility] [phone].    ATENCIÓN: Si habla español, tiene a bro disposición servicios gratuitos de asistencia lingüística. [facility] [phone].    We comply with applicable federal civil rights laws and [place] laws. We do not discriminate on the basis of race, color, national origin, age, disability, sex, sexual orientation, or gender identity.            Thank you!     Thank you for choosing [facility]  for your care. Our goal is always to provide you with excellent care. Hearing back from our patients is one way we can continue to improve our services. Please take a few minutes to complete the written survey that you may receive in the mail after your visit with us. Thank you!             Your Updated Medication List - Protect others around you: Learn how to safely use, store and throw away your medicines at www.disposemymeds.org.          This list is accurate as of: 12/22/17  3:41 PM.  Always use your most recent med list.                   Brand Name Dispense Instructions for use Diagnosis    prenatal multivitamin plus iron 27-0.8 MG Tabs per tablet      Take 1 tablet by mouth daily

## 2017-12-22 NOTE — PATIENT INSTRUCTIONS
If you have any questions regarding your visit, Please contact your care team.     [facility] Access Services: [phone]    [facility] CLINIC HOURS TELEPHONE NUMBER       GAGE Adams-      Ginny Mae-Medical Assistant       Monday-[place]  8:00a.m-4:45 p.m  Wednesday-[place] 8:00a.m-4:45 p.m.  Thursday-[place]  8:00a.m-4:45 p.m.  Friday-[place]  8:00a.m-4:45 p.m. [facility]  [address] [place] [address]  [phone] ask [facility]  [phone] Fax  Imaging [phone]    [facility] Labor and Delivery  [address] [place] [address] [place] [address]  [phone]    [facility]  [address] [place] [address]  [phone] [facility]  [phone] Fax  Imaging [phone]     Urgent Care locations:    [place]        [place] Monday-Friday  5 pm - 9 pm  Saturday and Sunday   9 am - 5 pm    Monday-Friday   11 am - 9 pm  Saturday and Sunday   9 am - 5 pm   [phone]       If you need a medication refill, please contact your pharmacy. Please allow 3 business days for your refill to be completed.  As always, Thank you for trusting us with your healthcare needs!

## 2017-12-22 NOTE — PROGRESS NOTES
No signif signs, symptoms or concerns. U/S noted- ok. Advice appropriate to gestational age reviewed including pertinent Checklist items. Discussed condition, tests and care plan including RBA. Problem list updated.   A/P:  Marcy was seen today for prenatal care.    Diagnoses and all orders for this visit:    Supervision of other normal pregnancy, antepartum  -     Glucose tolerance gest screen 1 hour  -     Hemoglobin    Low-lying placenta        Rashel Almonte MD

## 2018-01-05 DIAGNOSIS — Z34.80 SUPERVISION OF OTHER NORMAL PREGNANCY, ANTEPARTUM: ICD-10-CM

## 2018-01-05 LAB
GLUCOSE 1H P 100 G GLC PO SERPL-MCNC: 117 MG/DL (ref 60–179)
GLUCOSE 2H P 100 G GLC PO SERPL-MCNC: 131 MG/DL (ref 60–154)
GLUCOSE 3H P 100 G GLC PO SERPL-MCNC: 83 MG/DL (ref 60–139)
GLUCOSE BLDC GLUCOMTR-MCNC: 71 MG/DL (ref 70–99)
GLUCOSE P FAST SERPL-MCNC: 73 MG/DL (ref 60–94)

## 2018-01-05 PROCEDURE — 36415 COLL VENOUS BLD VENIPUNCTURE: CPT | Performed by: OBSTETRICS & GYNECOLOGY

## 2018-01-05 PROCEDURE — 82951 GLUCOSE TOLERANCE TEST (GTT): CPT | Performed by: OBSTETRICS & GYNECOLOGY

## 2018-01-05 PROCEDURE — 82952 GTT-ADDED SAMPLES: CPT | Performed by: OBSTETRICS & GYNECOLOGY

## 2018-01-08 ENCOUNTER — PRENATAL OFFICE VISIT (OUTPATIENT)
Dept: OBGYN | Facility: CLINIC | Age: 33
End: 2018-01-08
Payer: COMMERCIAL

## 2018-01-08 VITALS
BODY MASS INDEX: 22.76 KG/M2 | TEMPERATURE: 97.7 F | HEART RATE: 92 BPM | DIASTOLIC BLOOD PRESSURE: 63 MMHG | WEIGHT: 144 LBS | SYSTOLIC BLOOD PRESSURE: 101 MMHG

## 2018-01-08 DIAGNOSIS — Z34.80 SUPERVISION OF OTHER NORMAL PREGNANCY, ANTEPARTUM: ICD-10-CM

## 2018-01-08 DIAGNOSIS — Z23 NEED FOR TDAP VACCINATION: Primary | ICD-10-CM

## 2018-01-08 DIAGNOSIS — O44.40 LOW-LYING PLACENTA: ICD-10-CM

## 2018-01-08 PROCEDURE — 99207 ZZC PRENATAL VISIT: CPT | Performed by: OBSTETRICS & GYNECOLOGY

## 2018-01-08 PROCEDURE — 90471 IMMUNIZATION ADMIN: CPT | Performed by: OBSTETRICS & GYNECOLOGY

## 2018-01-08 PROCEDURE — 90715 TDAP VACCINE 7 YRS/> IM: CPT | Performed by: OBSTETRICS & GYNECOLOGY

## 2018-01-08 NOTE — NURSING NOTE
Screening Questionnaire for Adult Immunization    Are you sick today?   No   Do you have allergies to medications, food, a vaccine component or latex?   No   Have you ever had a serious reaction after receiving a vaccination?   No   Do you have a long-term health problem with heart disease, lung disease, asthma, kidney disease, metabolic disease (e.g. diabetes), anemia, or other blood disorder?   No   Do you have cancer, leukemia, HIV/AIDS, or any other immune system problem?   No   In the past 3 months, have you taken medications that affect  your immune system, such as prednisone, other steroids, or anticancer drugs; drugs for the treatment of rheumatoid arthritis, Crohn s disease, or psoriasis; or have you had radiation treatments?   No   Have you had a seizure, or a brain or other nervous system problem?   No   During the past year, have you received a transfusion of blood or blood     products, or been given immune (gamma) globulin or antiviral drug?   No   For women: Are you pregnant or is there a chance you could become        pregnant during the next month?   Yes   Have you received any vaccinations in the past 4 weeks?   No     Immunization questionnaire Established pregnancy patient.        Per orders of Dr. Almonte, injection of Tdap given by Carmelita Dela Cruz. Patient instructed to remain in clinic for 15 minutes afterwards, and to report any adverse reaction to me immediately.       Screening performed by Carmelita Dela Cruz on 1/8/2018 at 4:33 PM.

## 2018-01-08 NOTE — NURSING NOTE
"Chief Complaint   Patient presents with     Prenatal Care     OBV 31w2d       Initial /63 (BP Location: Left arm, Patient Position: Chair, Cuff Size: Adult Regular)  Pulse 92  Temp 97.7  F (36.5  C) (Oral)  Wt 65.3 kg (144 lb)  LMP 06/03/2017 (Exact Date)  Breastfeeding? No  BMI 22.76 kg/m2 Estimated body mass index is 22.76 kg/(m^2) as calculated from the following:    Height as of 12/22/17: 1.694 m (5' 6.69\").    Weight as of this encounter: 65.3 kg (144 lb).  Medication Reconciliation: complete   Carmelita Dela Cruz CMA      "

## 2018-01-08 NOTE — PROGRESS NOTES
Immunization History   Administered Date(s) Administered     HEPA 04/17/2006, 11/08/2006     HepB 03/15/2004, 04/26/2004, 09/23/2004     Influenza (IIV3) PF 10/01/2014     Influenza Vaccine IM 3yrs+ 4 Valent IIV4 10/08/2015     MMR 07/03/1985, 02/01/1996     Poliovirus, inactivated (IPV) 1984, 1984, 04/17/2006     TD (ADULT, 7+) 05/13/1998, 04/27/2009     TDAP Vaccine (Adacel) 01/08/2018     TDAP Vaccine (Boostrix) 09/10/2015     Tdap (Adacel,Boostrix) 06/25/2012     Typhoid IM 04/17/2006

## 2018-01-08 NOTE — PATIENT INSTRUCTIONS
If you have any questions regarding your visit, Please contact your care team.     [facility]: [phone]    [facility] CLINIC HOURS TELEPHONE NUMBER       GAGE Adams-      Ginny Mae-Medical Assistant       Monday-[place]  8:00a.m-4:45 p.m  Wednesday-[place] 8:00a.m-4:45 p.m.  Thursday-Janel [place]  8:00a.m-4:45 p.m.  Friday-[place]  8:00a.m-4:45 p.m. [facility]  [address] [place] [address]  [phone] ask [facility]  [phone] Fax  Imaging [phone]    [facility] Labor and Delivery  [address] [place] [address] [place] [address]  [phone]    [facility]  [address] [place] [address]  [phone] [facility]  [phone] Fax  Imaging [phone]     Urgent Care locations:    [place]        [place] Monday-Friday  5 pm - 9 pm  Saturday and Sunday   9 am - 5 pm    Monday-Friday   11 am - 9 pm  Saturday and Sunday   9 am - 5 pm   [phone]       If you need a medication refill, please contact your pharmacy. Please allow 3 business days for your refill to be completed.  As always, Thank you for trusting us with your healthcare needs!

## 2018-01-09 NOTE — PROGRESS NOTES
No signif signs, symptoms or concerns. 3h GTT was normal. Tdap given. Advice appropriate to gestational age reviewed including pertinent Checklist items. Discussed condition, tests and care plan including RBA. Problem list updated.   A/P:  Marcy was seen today for prenatal care.    Diagnoses and all orders for this visit:    Need for Tdap vaccination  -     TDAP VACCINE (ADACEL)  -     ADMIN 1st VACCINE    Low-lying placenta    Supervision of other normal pregnancy, antepartum  -     TDAP VACCINE (ADACEL)  -     ADMIN 1st VACCINE        Rashel Almonte MD

## 2018-01-22 ENCOUNTER — PRENATAL OFFICE VISIT (OUTPATIENT)
Dept: OBGYN | Facility: CLINIC | Age: 33
End: 2018-01-22
Payer: COMMERCIAL

## 2018-01-22 VITALS
WEIGHT: 143 LBS | DIASTOLIC BLOOD PRESSURE: 63 MMHG | TEMPERATURE: 97 F | BODY MASS INDEX: 22.61 KG/M2 | HEART RATE: 97 BPM | SYSTOLIC BLOOD PRESSURE: 94 MMHG

## 2018-01-22 DIAGNOSIS — O44.40 LOW-LYING PLACENTA: ICD-10-CM

## 2018-01-22 DIAGNOSIS — Z34.80 SUPERVISION OF OTHER NORMAL PREGNANCY, ANTEPARTUM: ICD-10-CM

## 2018-01-22 PROCEDURE — 99207 ZZC PRENATAL VISIT: CPT | Performed by: OBSTETRICS & GYNECOLOGY

## 2018-01-22 NOTE — PATIENT INSTRUCTIONS
If you have any questions regarding your visit, Please contact your care team.     [facility]: [phone]    [facility] CLINIC HOURS TELEPHONE NUMBER       GAGE Adams-      Ginny Mae-Medical Assistant       Monday-[place]  8:00a.m-4:45 p.m  Wednesday-[place] 8:00a.m-4:45 p.m.  Thursday-Jan[place]  8:00a.m-4:45 p.m.  Friday-[place]  8:00a.m-4:45 p.m. [facility]  [address] [place] [address]  [phone] ask [facility]  [phone] Fax  Imaging [phone]    [facility] Labor and Delivery  [address] [place] [address] [place] [address]  [phone]    [facility]  [address] [place] [address]  [phone] [facility]  [phone] Fax  Imaging [phone]     Urgent Care locations:    [place]        [place] Monday-Friday  5 pm - 9 pm  Saturday and Sunday   9 am - 5 pm    Monday-Friday   11 am - 9 pm  Saturday and Sunday   9 am - 5 pm   [phone]       If you need a medication refill, please contact your pharmacy. Please allow 3 business days for your refill to be completed.  As always, Thank you for trusting us with your healthcare needs!

## 2018-01-22 NOTE — MR AVS SNAPSHOT
After Visit Summary   1/22/2018    Marcy Diaz    MRN: [id number]           Patient Information     Date Of Birth          1985        Visit Information        Provider Department      1/22/2018 1:00 PM Rashel Almonte MD [facility]        Today's Diagnoses     Low-lying placenta        Supervision of other normal pregnancy, antepartum          Care Instructions                                                        If you have any questions regarding your visit, Please contact your care team.     [facility] Access Services: [phone]    Women s Health CLINIC HOURS TELEPHONE NUMBER       GAGE Adams-      Ginny Mae-Medical Assistant       Monday-[place]  8:00a.m-4:45 p.m  Wednesday-[place] 8:00a.m-4:45 p.m.  [facility]  8:00a.m-4:45 p.m.  Friday-[place]  8:00a.m-4:45 p.m. [facility]  [address]  [place], [place] [address] [phone] ask for [facility]  [phone] Fax  Imaging [phone]    [facility] Labor and Delivery  [address] [address]  [phone]    [facility]  [address] [facility] [place] [address]  [phone] ask [facility]  [phone] Fax  Imaging [phone]     Urgent Care locations:    [facility] Monday-Friday  5 pm - 9 pm  Saturday and Sunday   9 am - 5 pm    Monday-Friday   11 am - 9 pm  Saturday and Sunday   9 am - 5 pm   [phone]       If you need a medication refill, please contact your pharmacy. Please allow 3 business days for your refill to be completed.  As always, Thank you for trusting us with your healthcare needs!            Follow-ups after your visit        Who to contact     If you have questions or need follow up information about today's clinic visit or your schedule please contact [facility] directly at  "[phone].  Normal or non-critical lab and imaging results will be communicated to you by Gnzohart, letter or phone within 4 business days after the clinic has received the results. If you do not hear from us within 7 days, please contact the clinic through Gnzohart or phone. If you have a critical or abnormal lab result, we will notify you by phone as soon as possible.  Submit refill requests through roundCorner or call your pharmacy and they will forward the refill request to us. Please allow 3 business days for your refill to be completed.          Additional Information About Your Visit        Gnzohart Information     roundCorner lets you send messages to your doctor, view your test results, renew your prescriptions, schedule appointments and more. To sign up, go to [email or web address] . Click on \"Log in\" on the left side of the screen, which will take you to the Welcome page. Then click on \"Sign up Now\" on the right side of the page.     You will be asked to enter the access code listed below, as well as some personal information. Please follow the directions to create your username and password.     Your access code is: [email or web address]  Expires: 2018  1:05 PM     Your access code will  in 90 days. If you need help or a new code, please call your [place] clinic or [phone].        Care EveryWhere ID     This is your Care EveryWhere ID. This could be used by other organizations to access your [place] medical records  [id number]        Your Vitals Were     Pulse Temperature Last Period Breastfeeding? BMI (Body Mass Index)       97 97  F (36.1  C) (Oral) 2017 (Exact Date) No 22.61 kg/m2        Blood Pressure from Last 3 Encounters:   18 94/63   18 101/63   17 97/60    Weight from Last 3 Encounters:   18 64.9 kg (143 lb)   18 65.3 kg (144 lb)   17 62.9 kg (138 lb 9.6 oz)              Today, you had the following     No orders found for display       Primary Care " Provider Office Phone # Fax #    Neelam Ahmadi, ALISON HURTADO [phone]       [address]  [place] [address]        Equal Access to Services     MARIELY HOBBS : Hadii aad ku hadbartolomeo Soomaali, waaxda luqadaha, qaybta kaalmada adeegyada, viviana haleyn koltondayday rizzo justine mcdaniel. So [facility] [phone].    ATENCIÓN: Si habla español, tiene a bro disposición servicios gratuitos de asistencia lingüística. Llame al [phone].    We comply with applicable federal civil rights laws and [place] laws. We do not discriminate on the basis of race, color, national origin, age, disability, sex, sexual orientation, or gender identity.            Thank you!     Thank you for choosing [facility]  for your care. Our goal is always to provide you with excellent care. Hearing back from our patients is one way we can continue to improve our services. Please take a few minutes to complete the written survey that you may receive in the mail after your visit with us. Thank you!             Your Updated Medication List - Protect others around you: Learn how to safely use, store and throw away your medicines at www.disposemymeds.org.          This list is accurate as of: 1/22/18  1:05 PM.  Always use your most recent med list.                   Brand Name Dispense Instructions for use Diagnosis    prenatal multivitamin plus iron 27-0.8 MG Tabs per tablet      Take 1 tablet by mouth daily

## 2018-01-22 NOTE — NURSING NOTE
"Chief Complaint   Patient presents with     Prenatal Care     OBV 33w2d       Initial BP 94/63 (BP Location: Right arm, Patient Position: Chair, Cuff Size: Adult Regular)  Pulse 97  Temp 97  F (36.1  C) (Oral)  Wt 64.9 kg (143 lb)  LMP 06/03/2017 (Exact Date)  Breastfeeding? No  BMI 22.61 kg/m2 Estimated body mass index is 22.61 kg/(m^2) as calculated from the following:    Height as of 12/22/17: 1.694 m (5' 6.69\").    Weight as of this encounter: 64.9 kg (143 lb).  Medication Reconciliation: complete   Carmelita Dela Cruz CMA      "

## 2018-01-22 NOTE — PROGRESS NOTES
No signif signs, symptoms or concerns except mild back pain. Advice appropriate to gestational age reviewed including pertinent Checklist items. Discussed condition, tests and care plan including RBA. Problem list updated. Possible GBS next.  A/P:  Marcy was seen today for prenatal care.    Diagnoses and all orders for this visit:    Low-lying placenta    Supervision of other normal pregnancy, antepartum        Rashel Almonte MD

## 2018-02-05 ENCOUNTER — PRENATAL OFFICE VISIT (OUTPATIENT)
Dept: OBGYN | Facility: CLINIC | Age: 33
End: 2018-02-05
Payer: COMMERCIAL

## 2018-02-05 VITALS
SYSTOLIC BLOOD PRESSURE: 106 MMHG | HEART RATE: 82 BPM | DIASTOLIC BLOOD PRESSURE: 66 MMHG | WEIGHT: 148 LBS | TEMPERATURE: 97.2 F | BODY MASS INDEX: 23.4 KG/M2

## 2018-02-05 DIAGNOSIS — Z34.80 SUPERVISION OF OTHER NORMAL PREGNANCY, ANTEPARTUM: ICD-10-CM

## 2018-02-05 DIAGNOSIS — O44.40 LOW-LYING PLACENTA: ICD-10-CM

## 2018-02-05 PROCEDURE — 87653 STREP B DNA AMP PROBE: CPT | Performed by: OBSTETRICS & GYNECOLOGY

## 2018-02-05 PROCEDURE — 99207 ZZC PRENATAL VISIT: CPT | Performed by: OBSTETRICS & GYNECOLOGY

## 2018-02-05 PROCEDURE — 87186 SC STD MICRODIL/AGAR DIL: CPT | Performed by: OBSTETRICS & GYNECOLOGY

## 2018-02-05 NOTE — MR AVS SNAPSHOT
After Visit Summary   2/5/2018    Marcy Diaz    MRN: [id number]           Patient Information     Date Of Birth          1985        Visit Information        Provider Department      2/5/2018 4:15 PM Rashel Almonte MD [facility]        Today's Diagnoses     Low-lying placenta        Supervision of other normal pregnancy, antepartum          Care Instructions                                                        If you have any questions regarding your visit, Please contact your care team.     [facility] Access Services: [phone]    Women s Health CLINIC HOURS TELEPHONE NUMBER       GAGE Adams-      Ginny Mae-Medical Assistant       Monday-[place]  8:00a.m-4:45 p.m  Wednesday-[place] 8:00a.m-4:45 p.m.  [facility]  8:00a.m-4:45 p.m.  Friday-[place]  8:00a.m-4:45 p.m. [facility]  [address]  [place], [place] [address] [phone] ask for [facility]  [phone] Fax  Imaging [phone]    [facility] Labor and Delivery  [address] [place] [address]  [phone]    [facility]  [address] [facility] [place] [address]  [phone] ask [facility]  [phone] Fax  Imaging [phone]     Urgent Care locations:    [facility] Monday-Friday  5 pm - 9 pm  Saturday and Sunday   9 am - 5 pm    Monday-Friday   11 am - 9 pm  Saturday and Sunday   9 am - 5 pm   [phone]       If you need a medication refill, please contact your pharmacy. Please allow 3 business days for your refill to be completed.  As always, Thank you for trusting us with your healthcare needs!            Follow-ups after your visit        Who to contact     If you have questions or need follow up information about today's clinic visit or your schedule please contact [facility] directly at  "[phone].  Normal or non-critical lab and imaging results will be communicated to you by Changershart, letter or phone within 4 business days after the clinic has received the results. If you do not hear from us within 7 days, please contact the clinic through Changershart or phone. If you have a critical or abnormal lab result, we will notify you by phone as soon as possible.  Submit refill requests through Servergy or call your pharmacy and they will forward the refill request to us. Please allow 3 business days for your refill to be completed.          Additional Information About Your Visit        Changershart Information     Servergy lets you send messages to your doctor, view your test results, renew your prescriptions, schedule appointments and more. To sign up, go to [email or web address] . Click on \"Log in\" on the left side of the screen, which will take you to the Welcome page. Then click on \"Sign up Now\" on the right side of the page.     You will be asked to enter the access code listed below, as well as some personal information. Please follow the directions to create your username and password.     Your access code is: [email or web address]  Expires: 2018  1:05 PM     Your access code will  in 90 days. If you need help or a new code, please call your [place] clinic or [phone].        Care EveryWhere ID     This is your Care EveryWhere ID. This could be used by other organizations to access your [place] medical records  [id number]        Your Vitals Were     Pulse Temperature Last Period Breastfeeding? BMI (Body Mass Index)       82 97.2  F (36.2  C) (Oral) 2017 (Exact Date) No 23.4 kg/m2        Blood Pressure from Last 3 Encounters:   18 106/66   18 94/63   18 101/63    Weight from Last 3 Encounters:   18 148 lb (67.1 kg)   18 143 lb (64.9 kg)   18 144 lb (65.3 kg)              Today, you had the following     No orders found for display       Primary Care " Provider Office Phone # Fax #    Neelam Ahmadi, ALISON HURTADO [phone]       [address]  [place] [address]        Equal Access to Services     MARIELY HOBBS : Skyler elliott ku hadbartolomeo Soomaali, waaxda luqadaha, qaybta kaalmada adeegyada, viviana abigailin hayaan annabelle so justine mcdaniel. So [facility] [phone].    ATENCIÓN: Si habla español, tiene a bro disposición servicios gratuitos de asistencia lingüística. Llame al [phone].    We comply with applicable federal civil rights laws and [place] laws. We do not discriminate on the basis of race, color, national origin, age, disability, sex, sexual orientation, or gender identity.            Thank you!     Thank you for choosing [facility]  for your care. Our goal is always to provide you with excellent care. Hearing back from our patients is one way we can continue to improve our services. Please take a few minutes to complete the written survey that you may receive in the mail after your visit with us. Thank you!             Your Updated Medication List - Protect others around you: Learn how to safely use, store and throw away your medicines at www.disposemymeds.org.          This list is accurate as of 2/5/18  4:25 PM.  Always use your most recent med list.                   Brand Name Dispense Instructions for use Diagnosis    prenatal multivitamin plus iron 27-0.8 MG Tabs per tablet      Take 1 tablet by mouth daily

## 2018-02-05 NOTE — PATIENT INSTRUCTIONS
If you have any questions regarding your visit, Please contact your care team.     [facility] Access Services: [phone]    [facility] Health CLINIC HOURS TELEPHONE NUMBER       GAGE Adams-      Ginny Mae-Medical Assistant       Monday-[place]  8:00a.m-4:45 p.m  Wednesday-[place] 8:00a.m-4:45 p.m.  Thursday-[place]  8:00a.m-4:45 p.m.  Friday-[place]  8:00a.m-4:45 p.m. [facility]  [address] [place] [address]  [phone] ask [facility]  [phone] Fax  Imaging [phone]    [facility] Labor and Delivery  [address] [place] [address] [place] [address]  [phone]    [facility]  [address] [place] [address]  [phone] [facility]  [phone] Fax  Imaging [phone]     Urgent Care locations:    [place]        Janel [place] Monday-Friday  5 pm - 9 pm  Saturday and Sunday   9 am - 5 pm    Monday-Friday   11 am - 9 pm  Saturday and Sunday   9 am - 5 pm   [phone]       If you need a medication refill, please contact your pharmacy. Please allow 3 business days for your refill to be completed.  As always, Thank you for trusting us with your healthcare needs!

## 2018-02-05 NOTE — NURSING NOTE
"Chief Complaint   Patient presents with     Prenatal Care     OBV 35w2d       Initial /66 (BP Location: Left arm, Patient Position: Chair, Cuff Size: Adult Regular)  Pulse 82  Temp 97.2  F (36.2  C) (Oral)  Wt 148 lb (67.1 kg)  LMP 06/03/2017 (Exact Date)  Breastfeeding? No  BMI 23.4 kg/m2 Estimated body mass index is 23.4 kg/(m^2) as calculated from the following:    Height as of 12/22/17: 5' 6.69\" (1.694 m).    Weight as of this encounter: 148 lb (67.1 kg).  Medication Reconciliation: complete   Carmelita Dela Cruz CMA      "

## 2018-02-06 LAB
GP B STREP DNA SPEC QL NAA+PROBE: POSITIVE
SPECIMEN SOURCE: ABNORMAL

## 2018-02-06 NOTE — PROGRESS NOTES
No signif signs, symptoms or concerns. Discussed nuchal cords. Here with . Advice appropriate to gestational age reviewed including pertinent Checklist items. Discussed condition, tests and care plan including RBA. Problem list updated.   A/P:  Marcy was seen today for prenatal care.    Diagnoses and all orders for this visit:    Low-lying placenta    Supervision of other normal pregnancy, antepartum  -     Group B strep PCR        Rashel Almonte MD

## 2018-02-07 PROBLEM — O99.820 GBS (GROUP B STREPTOCOCCUS CARRIER), +RV CULTURE, CURRENTLY PREGNANT: Status: ACTIVE | Noted: 2018-02-07

## 2018-02-10 LAB
BACTERIA SPEC CULT: ABNORMAL
BACTERIA SPEC CULT: ABNORMAL
SPECIMEN SOURCE: ABNORMAL

## 2018-02-19 ENCOUNTER — PRENATAL OFFICE VISIT (OUTPATIENT)
Dept: OBGYN | Facility: CLINIC | Age: 33
End: 2018-02-19
Payer: COMMERCIAL

## 2018-02-19 VITALS
BODY MASS INDEX: 23.71 KG/M2 | SYSTOLIC BLOOD PRESSURE: 106 MMHG | HEART RATE: 81 BPM | TEMPERATURE: 97.3 F | DIASTOLIC BLOOD PRESSURE: 66 MMHG | WEIGHT: 150 LBS

## 2018-02-19 DIAGNOSIS — Z34.80 SUPERVISION OF OTHER NORMAL PREGNANCY, ANTEPARTUM: ICD-10-CM

## 2018-02-19 DIAGNOSIS — O44.40 LOW-LYING PLACENTA: ICD-10-CM

## 2018-02-19 DIAGNOSIS — O99.820 GBS (GROUP B STREPTOCOCCUS CARRIER), +RV CULTURE, CURRENTLY PREGNANT: ICD-10-CM

## 2018-02-19 PROCEDURE — 99207 ZZC PRENATAL VISIT: CPT | Performed by: OBSTETRICS & GYNECOLOGY

## 2018-02-19 NOTE — MR AVS SNAPSHOT
After Visit Summary   2/19/2018    Marcy Diaz    MRN: [id number]           Patient Information     Date Of Birth          1985        Visit Information        Provider Department      2/19/2018 3:30 PM Rashel Almonte MD [facility]        Today's Diagnoses     GBS (group B Streptococcus carrier), +RV culture, currently pregnant        Low-lying placenta        Supervision of other normal pregnancy, antepartum          Care Instructions                                                        If you have any questions regarding your visit, Please contact your care team.     [facility] Access Services: [phone]    [facility] CLINIC HOURS TELEPHONE NUMBER       GAGE Adams-      Ginny Mae-Medical Assistant       Monday-[place]  8:00a.m-4:45 p.m  Wednesday-[place] 8:00a.m-4:45 p.m.  Thursday-[place]  8:00a.m-4:45 p.m.  Friday-[place]  8:00a.m-4:45 p.m. [facility]  [address] [place] [address]  [phone] ask [facility]  [phone] Fax  Imaging [phone]    [facility] Labor and Delivery  [address] [place] [address] [place] [address]  [phone]    [facility]  [address] [place] [place] [address]  [phone] ask [facility]  [phone] Fax  Imaging [phone]     Urgent Care locations:    [facility] Monday-Friday  5 pm - 9 pm  Saturday and Sunday   9 am - 5 pm    Monday-Friday   11 am - 9 pm  Saturday and Sunday   9 am - 5 pm   [phone]       If you need a medication refill, please contact your pharmacy. Please allow 3 business days for your refill to be completed.  As always, Thank you for trusting us with your healthcare needs!            Follow-ups after your visit        Your next 10 appointments already scheduled     Feb 28, 2018  3:30 PM CST   ESTABLISHED  "PRENATAL with Rashel Almonte MD   [facility] ([facility])    [address] [facility] [address] [phone]            Mar 05, 2018  4:15 PM CST   ESTABLISHED PRENATAL with Rashel Almonte MD   [facility] ([facility])    [address] [place] [address] [phone]              Who to contact     If you have questions or need follow up information about today's clinic visit or your schedule please contact [facility] directly at [phone].  Normal or non-critical lab and imaging results will be communicated to you by MyChart, letter or phone within 4 business days after the clinic has received the results. If you do not hear from us within 7 days, please contact the clinic through scPharmaceuticalshart or phone. If you have a critical or abnormal lab result, we will notify you by phone as soon as possible.  Submit refill requests through [employer] or call your pharmacy and they will forward the refill request to us. Please allow 3 business days for your refill to be completed.          Additional Information About Your Visit        MyChart Information     3Touch lets you send messages to your doctor, view your test results, renew your prescriptions, schedule appointments and more. To sign up, go to [email or web address] . Click on \"Log in\" on the left side of the screen, which will take you to the Welcome page. Then click on \"Sign up Now\" on the right side of the page.     You will be asked to enter the access code listed below, as well as some personal information. Please follow the directions to create your username and password.     Your access code is: [email or web address]  Expires: 2018  1:05 PM     Your access code will  in 90 days. If you need help or a new code, please call your [facility] or [phone].        Care EveryWhere ID     This is your Care EveryWhere ID. This " could be used by other organizations to access your [place] medical records  [id number]        Your Vitals Were     Pulse Temperature Last Period Breastfeeding? BMI (Body Mass Index)       81 97.3  F (36.3  C) (Oral) 06/03/2017 (Exact Date) No 23.71 kg/m2        Blood Pressure from Last 3 Encounters:   02/19/18 106/66   02/05/18 106/66   01/22/18 94/63    Weight from Last 3 Encounters:   02/19/18 150 lb (68 kg)   02/05/18 148 lb (67.1 kg)   01/22/18 143 lb (64.9 kg)              Today, you had the following     No orders found for display       Primary Care Provider Office Phone # Fax #    Neelam Tamez Galdino, APRN [facility] [phone]       [address]  [place] [address]        Equal Access to Services     [facility]: Hadii aad ku hadasho Soomaali, waaxda luqadaha, qaybta kaalmada adeegyada, viviana hayesin hayignacion annabelle fletcher . So [facility] [phone].    ATENCIÓN: Si habla español, tiene a bro disposición servicios gratuitos de asistencia lingüística. Sybil al [phone].    We comply with applicable federal civil rights laws and [place] laws. We do not discriminate on the basis of race, color, national origin, age, disability, sex, sexual orientation, or gender identity.            Thank you!     Thank you for choosing [facility]  for your care. Our goal is always to provide you with excellent care. Hearing back from our patients is one way we can continue to improve our services. Please take a few minutes to complete the written survey that you may receive in the mail after your visit with us. Thank you!             Your Updated Medication List - Protect others around you: Learn how to safely use, store and throw away your medicines at www.disposemymeds.org.          This list is accurate as of 2/19/18  3:36 PM.  Always use your most recent med list.                   Brand Name Dispense Instructions for use Diagnosis    prenatal multivitamin plus iron  27-0.8 MG Tabs per tablet      Take 1 tablet by mouth daily

## 2018-02-19 NOTE — NURSING NOTE
"Chief Complaint   Patient presents with     Prenatal Care     OBV 37w2d       Initial /66 (BP Location: Left arm, Patient Position: Chair, Cuff Size: Adult Regular)  Pulse 81  Temp 97.3  F (36.3  C) (Oral)  Wt 150 lb (68 kg)  LMP 06/03/2017 (Exact Date)  Breastfeeding? No  BMI 23.71 kg/m2 Estimated body mass index is 23.71 kg/(m^2) as calculated from the following:    Height as of 12/22/17: 5' 6.69\" (1.694 m).    Weight as of this encounter: 150 lb (68 kg).  Medication Reconciliation: complete   Carmelita Dela Cruz CMA      "

## 2018-02-19 NOTE — PROGRESS NOTES
No signif signs, symptoms or concerns. Advice appropriate to gestational age reviewed including pertinent Checklist items. Discussed condition, tests and care plan including RBA. Problem list updated.   A/P:  Marcy was seen today for prenatal care.    Diagnoses and all orders for this visit:    GBS (group B Streptococcus carrier), +RV culture, currently pregnant    Low-lying placenta    Supervision of other normal pregnancy, antepartum        Rashel Almonte MD

## 2018-02-19 NOTE — PATIENT INSTRUCTIONS
If you have any questions regarding your visit, Please contact your care team.     [email or web address] Access Services: [phone]    [facility] CLINIC HOURS TELEPHONE NUMBER       GAGE Adams-      Ginny Mae-Medical Assistant       Monday-[place]  8:00a.m-4:45 p.m  Wednesday-[place] 8:00a.m-4:45 p.m.  Thursday-[place]  8:00a.m-4:45 p.m.  Friday-[employer]  8:00a.m-4:45 p.m. [facility]  [address] [place] [address]  [phone] ask [facility]  [phone] Fax  Imaging [phone]    [facility] Labor and Delivery  [address] [place] [address] [place] [address]  [phone]    [facility]  [address] [place] [address]  [phone] [facility]  [phone] Fax  Imaging [phone]     Urgent Care locations:    [place]        [place] Monday-Friday  5 pm - 9 pm  Saturday and Sunday   9 am - 5 pm    Monday-Friday   11 am - 9 pm  Saturday and Sunday   9 am - 5 pm   [phone]       If you need a medication refill, please contact your pharmacy. Please allow 3 business days for your refill to be completed.  As always, Thank you for trusting us with your healthcare needs!

## 2018-02-28 ENCOUNTER — PRENATAL OFFICE VISIT (OUTPATIENT)
Dept: OBGYN | Facility: CLINIC | Age: 33
End: 2018-02-28
Payer: COMMERCIAL

## 2018-02-28 VITALS
HEART RATE: 84 BPM | TEMPERATURE: 98.7 F | WEIGHT: 149 LBS | BODY MASS INDEX: 23.55 KG/M2 | SYSTOLIC BLOOD PRESSURE: 114 MMHG | DIASTOLIC BLOOD PRESSURE: 67 MMHG

## 2018-02-28 DIAGNOSIS — Z34.80 SUPERVISION OF OTHER NORMAL PREGNANCY, ANTEPARTUM: ICD-10-CM

## 2018-02-28 DIAGNOSIS — O99.820 GBS (GROUP B STREPTOCOCCUS CARRIER), +RV CULTURE, CURRENTLY PREGNANT: ICD-10-CM

## 2018-02-28 PROCEDURE — 99207 ZZC PRENATAL VISIT: CPT | Performed by: OBSTETRICS & GYNECOLOGY

## 2018-02-28 NOTE — MR AVS SNAPSHOT
After Visit Summary   2/28/2018    Marcy Diaz    MRN: [id number]           Patient Information     Date Of Birth          1985        Visit Information        Provider Department      2/28/2018 3:30 PM Rashel Almonte MD [facility]        Today's Diagnoses     GBS (group B Streptococcus carrier), +RV culture, currently pregnant        Supervision of other normal pregnancy, antepartum          Care Instructions                                                        If you have any questions regarding your visit, Please contact your care team.     [facility] Access Services: [phone]    [facility] CLINIC HOURS TELEPHONE NUMBER       Rashel Almonte M.D.      Precious-      Ginny Mae-Medical Assistant       Monday-[place]  8:00a.m-4:45 p.m  Wednesday-[place] 8:00a.m-4:45 p.m.  Thursday-[place]  8:00a.m-4:45 p.m.  Friday-[place]  8:00a.m-4:45 p.m. [facility]  [address] [place] [address]  [phone] ask for [facility]  [phone] Fax  Imaging [phone]    [facility] Labor and Delivery  [address] [place] [address] Dr.  [place], [place] [address] [phone]    [facility]  [address] [place] [address]  [phone] ask [facility]  [phone] Fax  Imaging [phone]     Urgent Care locations:    [facility] Monday-Friday  5 pm - 9 pm  Saturday and Sunday   9 am - 5 pm    Monday-Friday   11 am - 9 pm  Saturday and Sunday   9 am - 5 pm   [phone]       If you need a medication refill, please contact your pharmacy. Please allow 3 business days for your refill to be completed.  As always, Thank you for trusting us with your healthcare needs!            Follow-ups after your visit        Your next 10 appointments already scheduled     Feb 28, 2018  3:30 PM CST   ESTABLISHED PRENATAL with Rashel Guzmán  "MD [place]   [facility] ([facility])    [address] [facility] [id number] [phone]            Mar 05, 2018  4:15 PM CST   ESTABLISHED PRENATAL with Rashel Almonte MD   [facility] ([facility])    [address] [place] [address] [phone]              Who to contact     If you have questions or need follow up information about today's clinic visit or your schedule please contact [facility] directly at [phone].  Normal or non-critical lab and imaging results will be communicated to you by MyChart, letter or phone within 4 business days after the clinic has received the results. If you do not hear from us within 7 days, please contact the clinic through MyChart or phone. If you have a critical or abnormal lab result, we will notify you by phone as soon as possible.  Submit refill requests through Access Mobile or call your pharmacy and they will forward the refill request to us. Please allow 3 business days for your refill to be completed.          Additional Information About Your Visit        [employer] Information     Access Mobile lets you send messages to your doctor, view your test results, renew your prescriptions, schedule appointments and more. To sign up, go to [email or web address] . Click on \"Log in\" on the left side of the screen, which will take you to the Welcome page. Then click on \"Sign up Now\" on the right side of the page.     You will be asked to enter the access code listed below, as well as some personal information. Please follow the directions to create your username and password.     Your access code is: [email or web address]  Expires: 2018  1:05 PM     Your access code will  in 90 days. If you need help or a new code, please call your [facility] or [phone].        Care EveryWhere ID     This is your Care EveryWhere ID. This could be used by other " organizations to access your [place] medical records  [id number]        Your Vitals Were     Pulse Temperature Last Period Breastfeeding? BMI (Body Mass Index)       84 98.7  F (37.1  C) (Oral) 06/03/2017 (Exact Date) No 23.55 kg/m2        Blood Pressure from Last 3 Encounters:   02/28/18 114/67   02/19/18 106/66   02/05/18 106/66    Weight from Last 3 Encounters:   02/28/18 149 lb (67.6 kg)   02/19/18 150 lb (68 kg)   02/05/18 148 lb (67.1 kg)              Today, you had the following     No orders found for display       Primary Care Provider Office Phone # Fax #    Neelam Tamez Ahmadi, APRN [facility] [phone]       [address]  [place] [address]        Equal Access to Services     MARIELY HOBBS : Hadii aad ku hadasho Sopham, waaxda luqadaha, qaybta kaalmada adedaydayyada, viviana fletcher . So [facility] [phone].    ATENCIÓN: Si habla español, tiene a bro disposición servicios gratuitos de asistencia lingüística. Llame al [phone].    We comply with applicable federal civil rights laws and [place] laws. We do not discriminate on the basis of race, color, national origin, age, disability, sex, sexual orientation, or gender identity.            Thank you!     Thank you for choosing [facility]  for your care. Our goal is always to provide you with excellent care. Hearing back from our patients is one way we can continue to improve our services. Please take a few minutes to complete the written survey that you may receive in the mail after your visit with us. Thank you!             Your Updated Medication List - Protect others around you: Learn how to safely use, store and throw away your medicines at www.disposemymeds.org.          This list is accurate as of 2/28/18  3:12 PM.  Always use your most recent med list.                   Brand Name Dispense Instructions for use Diagnosis    prenatal multivitamin plus iron 27-0.8 MG Tabs per  tablet      Take 1 tablet by mouth daily

## 2018-02-28 NOTE — PROGRESS NOTES
No signif signs, symptoms or concerns. Advice appropriate to gestational age reviewed including pertinent Checklist items. Discussed condition, tests and care plan including RBA. Problem list updated.   A/P:  Marcy was seen today for prenatal care.    Diagnoses and all orders for this visit:    GBS (group B Streptococcus carrier), +RV culture, currently pregnant    Supervision of other normal pregnancy, antepartum        Rashel Almonte MD

## 2018-02-28 NOTE — NURSING NOTE
"Chief Complaint   Patient presents with     Prenatal Care     OBV 38w4d       Initial /67 (BP Location: Left arm, Patient Position: Chair, Cuff Size: Adult Regular)  Pulse 84  Temp 98.7  F (37.1  C) (Oral)  Wt 149 lb (67.6 kg)  LMP 06/03/2017 (Exact Date)  Breastfeeding? No  BMI 23.55 kg/m2 Estimated body mass index is 23.55 kg/(m^2) as calculated from the following:    Height as of 12/22/17: 5' 6.69\" (1.694 m).    Weight as of this encounter: 149 lb (67.6 kg).  Medication Reconciliation: complete   Carmelita Dela Cruz CMA      "

## 2018-02-28 NOTE — PATIENT INSTRUCTIONS
If you have any questions regarding your visit, Please contact your care team.     [employer] Access Services: [phone]    [facility] CLINIC HOURS TELEPHONE NUMBER       GAGE Adams-      Ginny Mae-Medical Assistant       Monday-[place]  8:00a.m-4:45 p.m  Wednesday-[place] 8:00a.m-4:45 p.m.  Thursday-[place]  8:00a.m-4:45 p.m.  Friday-[place]  8:00a.m-4:45 p.m. [facility]  [address] [place] [address]  [phone] ask [facility]  [phone] Fax  Imaging [phone]    [facility] Labor and Delivery  [address] [place] [address] [place] [address]  [phone]    [facility]  [address] [place] [address]  [phone] [facility]  [phone] Fax  Imaging [phone]     Urgent Care locations:    [place]        [place] Monday-Friday  5 pm - 9 pm  Saturday and Sunday   9 am - 5 pm    Monday-Friday   11 am - 9 pm  Saturday and Sunday   9 am - 5 pm   [phone]       If you need a medication refill, please contact your pharmacy. Please allow 3 business days for your refill to be completed.  As always, Thank you for trusting us with your healthcare needs!

## 2018-03-05 ENCOUNTER — PRENATAL OFFICE VISIT (OUTPATIENT)
Dept: OBGYN | Facility: CLINIC | Age: 33
End: 2018-03-05
Payer: COMMERCIAL

## 2018-03-05 ENCOUNTER — TELEPHONE (OUTPATIENT)
Dept: OBGYN | Facility: CLINIC | Age: 33
End: 2018-03-05

## 2018-03-05 VITALS
BODY MASS INDEX: 23.71 KG/M2 | SYSTOLIC BLOOD PRESSURE: 104 MMHG | TEMPERATURE: 97.7 F | WEIGHT: 150 LBS | DIASTOLIC BLOOD PRESSURE: 71 MMHG | HEART RATE: 82 BPM

## 2018-03-05 DIAGNOSIS — O99.820 GBS (GROUP B STREPTOCOCCUS CARRIER), +RV CULTURE, CURRENTLY PREGNANT: ICD-10-CM

## 2018-03-05 DIAGNOSIS — Z34.80 SUPERVISION OF OTHER NORMAL PREGNANCY, ANTEPARTUM: ICD-10-CM

## 2018-03-05 PROCEDURE — 99207 ZZC PRENATAL VISIT: CPT | Performed by: OBSTETRICS & GYNECOLOGY

## 2018-03-05 NOTE — NURSING NOTE
"Chief Complaint   Patient presents with     Prenatal Care     OBV 39w2d       Initial /71 (BP Location: Right arm, Patient Position: Chair, Cuff Size: Adult Regular)  Pulse 82  Temp 97.7  F (36.5  C) (Oral)  Wt 150 lb (68 kg)  LMP 06/03/2017 (Exact Date)  Breastfeeding? No  BMI 23.71 kg/m2 Estimated body mass index is 23.71 kg/(m^2) as calculated from the following:    Height as of 12/22/17: 5' 6.69\" (1.694 m).    Weight as of this encounter: 150 lb (68 kg).  Medication Reconciliation: complete   Carmelita Dela Cruz CMA      "

## 2018-03-05 NOTE — MR AVS SNAPSHOT
After Visit Summary   3/5/2018    Marcy Diaz    MRN: [id number]           Patient Information     Date Of Birth          1985        Visit Information        Provider Department      3/5/2018 2:15 PM Rashel Almonte MD [facility]        Today's Diagnoses     GBS (group B Streptococcus carrier), +RV culture, currently pregnant        Supervision of other normal pregnancy, antepartum          Care Instructions                                                        If you have any questions regarding your visit, Please contact your care team.     [facility] Access Services: [phone]    [facility] CLINIC HOURS TELEPHONE NUMBER       Rashel Almonte M.D.      Precious-      Ginny Mae-Medical Assistant       Monday-[place]  8:00a.m-4:45 p.m  Wednesday-[place] 8:00a.m-4:45 p.m.  Thursday-[place]  8:00a.m-4:45 p.m.  Friday-[place]  8:00a.m-4:45 p.m. [facility]  [address] [place] [address]  [phone] ask for [facility]  [phone] Fax  Imaging [id number]-[phone]    [facility] Labor and Delivery  [address] [place] [address] Dr.  [place], [place] [address] [phone]    [facility]  [address] [facility] [place] [address]  [phone] ask [facility]  [phone] Fax  Imaging [phone]     Urgent Care locations:    [facility] Monday-Friday  5 pm - 9 pm  Saturday and Sunday   9 am - 5 pm    Monday-Friday   11 am - 9 pm  Saturday and Sunday   9 am - 5 pm   [phone]       If you need a medication refill, please contact your pharmacy. Please allow 3 business days for your refill to be completed.  As always, Thank you for trusting us with your healthcare needs!            Follow-ups after your visit        Your next 10 appointments already scheduled     Mar 12, 2018  3:45 PM CDT   ESTABLISHED PRENATAL with Rashel Almonte MD  "  [facility] ([facility])    [address] [place] [address] [phone]              Who to contact     If you have questions or need follow up information about today's clinic visit or your schedule please contact [facility] directly at [phone].  Normal or non-critical lab and imaging results will be communicated to you by MyChart, letter or phone within 4 business days after the clinic has received the results. If you do not hear from us within 7 days, please contact the clinic through MyChart or phone. If you have a critical or abnormal lab result, we will notify you by phone as soon as possible.  Submit refill requests through [employer] or call your pharmacy and they will forward the refill request to us. Please allow 3 business days for your refill to be completed.          Additional Information About Your Visit        [employer] Information     [employer] lets you send messages to your doctor, view your test results, renew your prescriptions, schedule appointments and more. To sign up, go to [email or web address] . Click on \"Log in\" on the left side of the screen, which will take you to the Welcome page. Then click on \"Sign up Now\" on the right side of the page.     You will be asked to enter the access code listed below, as well as some personal information. Please follow the directions to create your username and password.     Your access code is: [email or web address]  Expires: 2018  1:05 PM     Your access code will  in 90 days. If you need help or a new code, please call your [facility] or [phone].        Care EveryWhere ID     This is your Care EveryWhere ID. This could be used by other organizations to access your [place] medical records  [id number]        Your Vitals Were     Pulse Temperature Last Period Breastfeeding? BMI (Body Mass Index)       82 97.7  F (36.5  C) (Oral) 2017 (Exact Date) No 23.71 kg/m2  "       Blood Pressure from Last 3 Encounters:   03/05/18 104/71   02/28/18 114/67   02/19/18 106/66    Weight from Last 3 Encounters:   03/05/18 150 lb (68 kg)   02/28/18 149 lb (67.6 kg)   02/19/18 150 lb (68 kg)              Today, you had the following     No orders found for display       Primary Care Provider Office Phone # Fax #    Neelam Ahmadi, APRN [facility] [phone]       [address]  [place] [address]        Equal Access to Services     [facility]: Hadii aad ku hadasho Soomaali, waaxda luqadaha, qaybta kaalmada adeegyada, viviana fletcher . So [facility] [phone].    ATENCIÓN: Si habla español, tiene a bro disposición servicios gratuitos de asistencia lingüística. Llame al [phone].    We comply with applicable federal civil rights laws and [place] laws. We do not discriminate on the basis of race, color, national origin, age, disability, sex, sexual orientation, or gender identity.            Thank you!     Thank you for choosing [facility]  for your care. Our goal is always to provide you with excellent care. Hearing back from our patients is one way we can continue to improve our services. Please take a few minutes to complete the written survey that you may receive in the mail after your visit with us. Thank you!             Your Updated Medication List - Protect others around you: Learn how to safely use, store and throw away your medicines at www.disposemymeds.org.          This list is accurate as of 3/5/18  2:17 PM.  Always use your most recent med list.                   Brand Name Dispense Instructions for use Diagnosis    prenatal multivitamin plus iron 27-0.8 MG Tabs per tablet      Take 1 tablet by mouth daily

## 2018-03-05 NOTE — PATIENT INSTRUCTIONS
If you have any questions regarding your visit, Please contact your care team.     [employer] [facility]: [phone]    [facility] CLINIC HOURS TELEPHONE NUMBER       GAGE Adams-      Ginny Mae-Medical Assistant       Monday-[place]  8:00a.m-4:45 p.m  Wednesday-[place] 8:00a.m-4:45 p.m.  Thursday-Jan[place]  8:00a.m-4:45 p.m.  Friday-[place]  8:00a.m-4:45 p.m. [facility]  [address] [place] [address]  [phone] ask [facility]  [phone] Fax  Imaging [phone]    [facility] Labor and Delivery  [address] [place] [address] [place] [address]  [phone]    [facility]  [address] [place] [address]  [phone] [facility]  [phone] Fax  Imaging [phone]     Urgent Care locations:    [place]        Janel Johnson Monday-Friday  5 pm - 9 pm  Saturday and Sunday   9 am - 5 pm    Monday-Friday   11 am - 9 pm  Saturday and Sunday   9 am - 5 pm   [phone]       If you need a medication refill, please contact your pharmacy. Please allow 3 business days for your refill to be completed.  As always, Thank you for trusting us with your healthcare needs!

## 2018-03-05 NOTE — TELEPHONE ENCOUNTER
[facility] Call Center    Phone Message    May a detailed message be left on voicemail: yes    Reason for Call: Other: [employer] had faxed disability papers to Dr Almonte twice.  Checking on status.  Please call.  Confirmed fax number as being correct.      Action Taken: Message routed to:  [facility] p [phone]

## 2018-03-06 NOTE — TELEPHONE ENCOUNTER
I called [employer] and spoke to Billy. I told him we can not find a disability form.  He will refax to [phone].    YANELY Alexandre 3/6/2018

## 2018-03-07 ENCOUNTER — NURSE TRIAGE (OUTPATIENT)
Dept: NURSING | Facility: CLINIC | Age: 33
End: 2018-03-07

## 2018-03-08 NOTE — TELEPHONE ENCOUNTER
Dr Agbeh paged at 10:59.  Marcy Joe, 1985,   [id number]  Due today. Second pregnancy. Maribeth moraes.  [phone]  Lana Vidal will call back if no call received by 11:10pm  Lana CHILDS RN [facility]

## 2018-04-23 ENCOUNTER — PRENATAL OFFICE VISIT (OUTPATIENT)
Dept: OBGYN | Facility: CLINIC | Age: 33
End: 2018-04-23
Payer: COMMERCIAL

## 2018-04-23 VITALS
TEMPERATURE: 97.9 F | HEART RATE: 63 BPM | BODY MASS INDEX: 20.55 KG/M2 | SYSTOLIC BLOOD PRESSURE: 113 MMHG | WEIGHT: 130 LBS | DIASTOLIC BLOOD PRESSURE: 75 MMHG

## 2018-04-23 DIAGNOSIS — Z30.09 GENERAL COUNSELING FOR PRESCRIPTION OF ORAL CONTRACEPTIVES: ICD-10-CM

## 2018-04-23 PROBLEM — O99.820 GBS (GROUP B STREPTOCOCCUS CARRIER), +RV CULTURE, CURRENTLY PREGNANT: Status: RESOLVED | Noted: 2018-02-07 | Resolved: 2018-04-23

## 2018-04-23 PROBLEM — Z34.80 SUPERVISION OF OTHER NORMAL PREGNANCY, ANTEPARTUM: Status: RESOLVED | Noted: 2017-08-28 | Resolved: 2018-04-23

## 2018-04-23 PROCEDURE — 99207 ZZC POST PARTUM EXAM: CPT | Performed by: OBSTETRICS & GYNECOLOGY

## 2018-04-23 RX ORDER — ACETAMINOPHEN AND CODEINE PHOSPHATE 120; 12 MG/5ML; MG/5ML
1 SOLUTION ORAL DAILY
Qty: 84 TABLET | Refills: 4 | Status: SHIPPED | OUTPATIENT
Start: 2018-04-23 | End: 2019-05-07

## 2018-04-23 NOTE — NURSING NOTE
"Chief Complaint   Patient presents with     Post Partum Exam     DOD 3/8/18       Initial /75 (BP Location: Right arm, Patient Position: Chair, Cuff Size: Adult Regular)  Pulse 63  Temp 97.9  F (36.6  C) (Oral)  Wt 130 lb (59 kg)  LMP 06/03/2017 (Exact Date)  Breastfeeding? Yes  BMI 20.55 kg/m2 Estimated body mass index is 20.55 kg/(m^2) as calculated from the following:    Height as of 12/22/17: 5' 6.69\" (1.694 m).    Weight as of this encounter: 130 lb (59 kg).  Medication Reconciliation: complete   Carmelita Dela Cruz CMA      "

## 2018-04-23 NOTE — PROGRESS NOTES
Marcy Diaz is a 33 year old year old G 2 P 2 who is here today for a postpartum exam.    HPI:      Doing well and without signif sx or concerns. Had some INGE before. Currently breast feeding infant. Here today with infant daughter. Infant doing well. Contraceptive method planned is POP and vas planned. NSA since delivery. PP depression screening as noted. See PHQ-9. Score = 1.    Past medical, obstetrical, surgical, family and social history reviewed and as noted or updated in chart.     Allergies, meds and supplements are as noted or updated in chart.      ROS:     Systems reviewed include constitutional; breast;                 cardiac; respiratory; gastrointestinal; genitourinary;                                musculoskeletal; integumentary; psychological;                                hematologic/lymphatic and endocrine.                  These systems were negative for significant symptoms except                 for the following: none and see HPI.                                Exam:  VS as noted.                    Yury instructions.      Assessment: Postpartum exam    Plan and Recommendations: Symptoms, problems and concerns reviewed.  Discussed pregnancy, birth, future pregnancy plans, work plans and infant care issues.  Problem list updated and Pregnancy Episode closed. See orders. RTC in 12 months and Pap/HRHPV due then.  Medications and prescriptions given as noted.  I reviewed side effects, risks, benefits and instructions on proper use.      Marcy was seen today for post partum exam.    Diagnoses and all orders for this visit:    Routine postpartum follow-up    General counseling for prescription of oral contraceptives  -     norethindrone (MICRONOR) 0.35 MG per tablet; Take 1 tablet (0.35 mg) by mouth daily        Rashel Almonte MD

## 2018-04-23 NOTE — MR AVS SNAPSHOT
After Visit Summary   4/23/2018    Marcy Diaz    MRN: [id number]           Patient Information     Date Of Birth          1985        Visit Information        Provider Department      4/23/2018 10:00 AM Rashel Almonte MD [facility]        Care Instructions                                                        If you have any questions regarding your visit, Please contact your care team.     [facility]: [phone]    [facility] CLINIC HOURS TELEPHONE NUMBER       Rashel Almonte M.D.      Precious-      Ginny Ralph-VALENTINE Mae-Medical Assistant       [facility]  8:00[facility].m-4:45 p.m  Tuesday-[place]  9:00a.m-11:45 a.m  Wednesday-[place] 8:00a.m-4:45 p.m.  Thursday-[place]  8:00a.m-4:45 p.m.  Friday-[place]  8:00a.m-4:45 p.m. [facility]  [address] [place] [address] [address].  [place], [place] [address] [phone] ask for [facility]  [phone] Fax  Imaging [phone]    [facility] Labor and Delivery  [address] [place] [address] Dr.  [place], [place] [address] [phone]    [facility]  [address] [place] [address]  [phone] ask [facility]  [phone] Fax  Imaging [phone]     Urgent Care locations:    [facility] Monday-Friday  5 pm - 9 pm  Saturday and Sunday   9 am - 5 pm    Monday-Friday   11 am - 9 pm  Saturday and Sunday   9 am - 5 pm   [phone]       If you need a medication refill, please contact your pharmacy. Please allow 3 business days for your refill to be completed.  As always, Thank you for trusting us with your healthcare needs!            Follow-ups after your visit        Who to contact     If you have questions or need follow up information about today's clinic visit or your schedule please contact [facility] directly at [phone].  Normal or non-critical lab and  imaging results will be communicated to you by MyChart, letter or phone within 4 business days after the clinic has received the results. If you do not hear from us within 7 days, please contact the clinic through [employer] or phone. If you have a critical or abnormal lab result, we will notify you by phone as soon as possible.  Submit refill requests through [employer] or call your pharmacy and they will forward the refill request to us. Please allow 3 business days for your refill to be completed.          Additional Information About Your Visit        [employer] Information     [employer] gives you secure access to your electronic health record. If you see a primary care provider, you can also send messages to your care team and make appointments. If you have questions, please call your primary care clinic.  If you do not have a primary care provider, please call [phone] and they will assist you.        Care EveryWhere ID     This is your Care EveryWhere ID. This could be used by other organizations to access your [place] medical records  [id number]        Your Vitals Were     Pulse Temperature Last Period Breastfeeding? BMI (Body Mass Index)       63 97.9  F (36.6  C) (Oral) 06/03/2017 (Exact Date) Yes 20.55 kg/m2        Blood Pressure from Last 3 Encounters:   04/23/18 113/75   03/05/18 104/71   02/28/18 114/67    Weight from Last 3 Encounters:   04/23/18 130 lb (59 kg)   03/05/18 150 lb (68 kg)   02/28/18 149 lb (67.6 kg)              Today, you had the following     No orders found for display       Primary Care Provider Office Phone # Fax #    NeelamALISON Schmidt [facility] [phone]       [address]  [place] [address]        Equal Access to Services     [facility] HospitalASHLEY : Hadii earl Hernandez, waleahda kelsey, qaybta kaalmada shreya, viviana mcdaniel. So [facility] [phone].    ATENCIÓN: Si habla español, tiene a bro disposición servicios gratuitos  de asistencia lingüística. Sybil estrada [phone].    We comply with applicable federal civil rights laws and [place] laws. We do not discriminate on the basis of race, color, national origin, age, disability, sex, sexual orientation, or gender identity.            Thank you!     Thank you for choosing [facility]  for your care. Our goal is always to provide you with excellent care. Hearing back from our patients is one way we can continue to improve our services. Please take a few minutes to complete the written survey that you may receive in the mail after your visit with us. Thank you!             Your Updated Medication List - Protect others around you: Learn how to safely use, store and throw away your medicines at www.disposemymeds.org.          This list is accurate as of 4/23/18 10:06 AM.  Always use your most recent med list.                   Brand Name Dispense Instructions for use Diagnosis    prenatal multivitamin plus iron 27-0.8 MG Tabs per tablet      Take 1 tablet by mouth daily

## 2018-04-23 NOTE — PATIENT INSTRUCTIONS
If you have any questions regarding your visit, Please contact your care team.     [employer] [facility]: [phone]    [facility] CLINIC HOURS TELEPHONE NUMBER       Rashel Almonte M.D.      Precious-      Ginny Mae-Medical Assistant       Monday-[place]  8:00a.m-4:45 p.m  Tuesday-[place]  9:00a.m-11:45 a.m  Wednesday-[place] 8:00a.m-4:45 p.m.  Thursday-[place]  8:00a.m-4:45 p.m.  Friday-[place]  8:00a.m-4:45 p.m. [facility]  [address] [place] [address]  [phone] ask for [facility]  [phone] Fax  Imaging [phone]    [facility] Labor and Delivery  [address] [place] [address] [place] [address]  [phone]    [facility]  [address] [place] [address]  [phone] ask [place] [facility]  [phone] Fax  Imaging [phone]     Urgent Care locations:    [facility] Monday-Friday  5 pm - 9 pm  Saturday and Sunday   9 am - 5 pm    Monday-Friday   11 am - 9 pm  Saturday and Sunday   9 am - 5 pm   [phone]       If you need a medication refill, please contact your pharmacy. Please allow 3 business days for your refill to be completed.  As always, Thank you for trusting us with your healthcare needs!

## 2018-04-24 ASSESSMENT — PATIENT HEALTH QUESTIONNAIRE - PHQ9: SUM OF ALL RESPONSES TO PHQ QUESTIONS 1-9: 0

## 2018-08-06 ENCOUNTER — TELEPHONE (OUTPATIENT)
Dept: PEDIATRICS | Facility: CLINIC | Age: 33
End: 2018-08-06

## 2018-08-06 NOTE — TELEPHONE ENCOUNTER
[facility] Call Center    Phone Message: Marcy  Phone: [phone]    May a detailed message be left on voicemail: yes    Reason for Call: Marcy called and said her baby has Thrush and she is currently breast feeding.  She is requesting a call to discuss treatment for herself.  Please advise.  Thank you,    Action Taken: Message routed to:  Primary Care p [phone]

## 2018-08-06 NOTE — TELEPHONE ENCOUNTER
Left message for patient to return call to clinic and ask to speak with RN.        Dinora Boyd RN,   [facility]

## 2018-08-06 NOTE — TELEPHONE ENCOUNTER
Routed to Diya Ahmadi.    Dinora Boyd RN,   [facility]

## 2018-08-07 NOTE — TELEPHONE ENCOUNTER
"Called patient, she states she had thrush on her nipples from her baby.  \"She  got it taken care of\".      She states she only goes to the clinic when having a baby.  She is not interested in getting an exam with Diya Ahmadi NP even if it has been 3 years.    Let her know, she would need to be seen for orders or advisement.  She verbalized understanding.    Lisette Navarrete RN, [facility]    "

## 2018-12-04 ENCOUNTER — HEALTH MAINTENANCE LETTER (OUTPATIENT)
Age: 33
End: 2018-12-04

## 2019-05-07 ENCOUNTER — OFFICE VISIT (OUTPATIENT)
Dept: DERMATOLOGY | Facility: CLINIC | Age: 34
End: 2019-05-07
Payer: COMMERCIAL

## 2019-05-07 DIAGNOSIS — L70.0 ACNE VULGARIS: Primary | ICD-10-CM

## 2019-05-07 PROCEDURE — 99202 OFFICE O/P NEW SF 15 MIN: CPT | Performed by: DERMATOLOGY

## 2019-05-07 RX ORDER — DROSPIRENONE AND ETHINYL ESTRADIOL 0.02-3(28)
1 KIT ORAL DAILY
Qty: 28 TABLET | Refills: 11 | Status: SHIPPED | OUTPATIENT
Start: 2019-05-07 | End: 2020-04-14

## 2019-05-07 ASSESSMENT — PAIN SCALES - GENERAL: PAINLEVEL: NO PAIN (0)

## 2019-05-07 NOTE — LETTER
5/7/2019         RE: Marcy Diaz  [address] [facility] [address]        Dear Colleague,    Thank you for referring your patient, Marcy Diaz, to the [facility] CLINICS. Please see a copy of my visit note below.    [facility] Dermatology Note      Dermatology Problem List:  1. Acne, hormonal  - s/p spironolactone 100 mg daily, BPO wash, clindamycin, tretinoin 0.025% cream  - current tx: OCP (Ambar), BPO wash, differin 0.1% gel    Encounter Date: May 7, 2019    CC:  Chief Complaint   Patient presents with     Acne         History of Present Illness:  Ms. Marcy Diaz is a 34 year old female who presents in self referral for acne. She has been dealing with acne her whole life. She is not pregnant or breastfeeding currently. She has been for the last 3 years and during that time did not treat her acne with anything. Her acne flared after pregnancy. She is currently using BPO wash but is not using any other medications. Patient is not on birth control at this time, but is not planning for more children. She is not doing anything for birth control currently, but notes she would like to start something. She notices a correlation of her acne flares with her menstrual cycles. No history of blood clots. Does not smoke. No history of hypertension. No history of migraines. No family history of blood clots. No other concerns addressed today.    Past Medical History:   Patient Active Problem List   Diagnosis     Acne     CARDIOVASCULAR SCREENING; LDL GOAL LESS THAN 160     Past Medical History:   Diagnosis Date     Acne      ASCUS favor benign 2013    neg HPV; resolved     Depression with anxiety 2005     Past Surgical History:   Procedure Laterality Date     EXTRACTION(S) DENTAL       EYE SURGERY  1985 & 1998    strabismus correction       Social History:  Patient is an NP at [employer].     Family History:  Father has history of SCC.     Medications:  Current  Outpatient Medications   Medication Sig Dispense Refill     drospirenone-ethinyl estradiol (LAUREN) 3-0.02 MG tablet Take 1 tablet by mouth daily 28 tablet 11     Prenatal Vit-Fe Fumarate-FA (PRENATAL MULTIVITAMIN PLUS IRON) 27-0.8 MG TABS per tablet Take 1 tablet by mouth daily         No Known Allergies    Review of Systems:  -Constitutional: Patient is otherwise feeling well, in usual state of health.   -Skin: As above in HPI. No additional skin concerns.    Physical exam:  Vitals: There were no vitals taken for this visit.  GEN: This is a well developed, well-nourished female in no acute distress, in a pleasant mood.    SKIN: Acne exam, which includes the face, neck, upper central chest, and upper central back was performed.  - Huff Type II  - 4-5 acne papules/cysts along the jaw line  - Inflammatory papules on the forehead as well, around 10 in all  - superficial acne papules scattered on the back  - chest is clear  - No other lesions of concern on areas examined.       Impression/Plan:    1. Acne vulgaris. Discussed the hormonal component of her acne. Discussed that all acne treatments are drying to the skin. Discussed time course to see improvement.     In the morning: Recommended Neutrogena 3.5% clear pore BPO wash.     At night: recommended gentle facial cleanser with otc Differin 0.1% gel.     Prescribed a combined birth control with Drospirenone. Discussed risk of blood clots in detail today. Recommended annual follow with an ob/gyn for appropriate gyn examinations/testing.     Follow-up 3 months, sooner for lesions of concern.       Staff Involved:  Scribe/Staff    Scribe Disclosure  I, Joan Pollack, am serving as a scribe to document services personally performed by Dr. Diya Cui MD, based on data collection and the provider's statements to me.     Provider Disclosure:   The documentation recorded by the scribe accurately reflects the services I personally performed and the decisions made  by me.    Diya Cui MD    Department of Dermatology  [facility]: Phone: [phone], Fax:[phone]  [facility] [place]: Phone: [phone], Fax: [phone]              Again, thank you for allowing me to participate in the care of your patient.        Sincerely,        Diya Cui MD

## 2019-05-07 NOTE — PROGRESS NOTES
[facility] Dermatology Note      Dermatology Problem List:  1. Acne, hormonal  - s/p spironolactone 100 mg daily, BPO wash, clindamycin, tretinoin 0.025% cream  - current tx: OCP (Lauren), BPO wash, differin 0.1% gel    Encounter Date: May 7, 2019    CC:  Chief Complaint   Patient presents with     Acne         History of Present Illness:  Ms. Marcy Diaz is a 34 year old female who presents in self referral for acne. She has been dealing with acne her whole life. She is not pregnant or breastfeeding currently. She has been for the last 3 years and during that time did not treat her acne with anything. Her acne flared after pregnancy. She is currently using BPO wash but is not using any other medications. Patient is not on birth control at this time, but is not planning for more children. She is not doing anything for birth control currently, but notes she would like to start something. She notices a correlation of her acne flares with her menstrual cycles. No history of blood clots. Does not smoke. No history of hypertension. No history of migraines. No family history of blood clots. No other concerns addressed today.    Past Medical History:   Patient Active Problem List   Diagnosis     Acne     CARDIOVASCULAR SCREENING; LDL GOAL LESS THAN 160     Past Medical History:   Diagnosis Date     Acne      ASCUS favor benign 2013    neg HPV; resolved     Depression with anxiety 2005     Past Surgical History:   Procedure Laterality Date     EXTRACTION(S) DENTAL       EYE SURGERY  1985 & 1998    strabismus correction       Social History:  Patient is an NP at [employer].     Family History:  Father has history of SCC.     Medications:  Current Outpatient Medications   Medication Sig Dispense Refill     drospirenone-ethinyl estradiol (LAUREN) 3-0.02 MG tablet Take 1 tablet by mouth daily 28 tablet 11     Prenatal Vit-Fe Fumarate-FA (PRENATAL MULTIVITAMIN PLUS IRON) 27-0.8 MG TABS per tablet Take 1  tablet by mouth daily         No Known Allergies    Review of Systems:  -Constitutional: Patient is otherwise feeling well, in usual state of health.   -Skin: As above in HPI. No additional skin concerns.    Physical exam:  Vitals: There were no vitals taken for this visit.  GEN: This is a well developed, well-nourished female in no acute distress, in a pleasant mood.    SKIN: Acne exam, which includes the face, neck, upper central chest, and upper central back was performed.  - Huff Type II  - 4-5 acne papules/cysts along the jaw line  - Inflammatory papules on the forehead as well, around 10 in all  - superficial acne papules scattered on the back  - chest is clear  - No other lesions of concern on areas examined.       Impression/Plan:    1. Acne vulgaris. Discussed the hormonal component of her acne. Discussed that all acne treatments are drying to the skin. Discussed time course to see improvement.     In the morning: Recommended Neutrogena 3.5% clear pore BPO wash.     At night: recommended gentle facial cleanser with otc Differin 0.1% gel.     Prescribed a combined birth control with Drospirenone. Discussed risk of blood clots in detail today. Recommended annual follow with an ob/gyn for appropriate gyn examinations/testing.     Follow-up 3 months, sooner for lesions of concern.       Staff Involved:  Scribe/Staff    Scribe Disclosure  I, Joan Pollack, am serving as a scribe to document services personally performed by Dr. Diya Cui MD, based on data collection and the provider's statements to me.     Provider Disclosure:   The documentation recorded by the scribe accurately reflects the services I personally performed and the decisions made by me.    Diya Cui MD    Department of Dermatology  [facility]: Phone: [phone], Fax:[phone]  [facility]: Phone:  [phone], Fax: [phone]

## 2019-05-07 NOTE — NURSING NOTE
@Marcy Diaz's goals for this visit include:   Chief Complaint   Patient presents with     Acne       She requests these members of her care team be copied on today's visit information: NO    PCP: Neelam Ahmadi    Referring Provider:  No referring provider defined for this encounter.    There were no vitals taken for this visit.    Do you need any medication refills at today's visit? NO    Sabrina Shabazz CMA

## 2019-08-08 ENCOUNTER — OFFICE VISIT (OUTPATIENT)
Dept: DERMATOLOGY | Facility: CLINIC | Age: 34
End: 2019-08-08
Payer: COMMERCIAL

## 2019-08-08 DIAGNOSIS — L70.0 ACNE VULGARIS: Primary | ICD-10-CM

## 2019-08-08 PROCEDURE — 99213 OFFICE O/P EST LOW 20 MIN: CPT | Performed by: DERMATOLOGY

## 2019-08-08 RX ORDER — SPIRONOLACTONE 50 MG/1
100 TABLET, FILM COATED ORAL DAILY
Qty: 60 TABLET | Refills: 2 | Status: SHIPPED | OUTPATIENT
Start: 2019-08-08 | End: 2019-11-06

## 2019-08-08 ASSESSMENT — PAIN SCALES - GENERAL: PAINLEVEL: NO PAIN (0)

## 2019-08-08 NOTE — PROGRESS NOTES
[facility] Dermatology Note      Dermatology Problem List:  1. Acne, hormonal  - s/p spironolactone 100 mg daily, BPO wash, clindamycin, tretinoin 0.025% cream  - current tx: OCP (Lauren), BPO wash, differin 0.1% gel, spironolactone 100 mg daily    Encounter Date: Aug 8, 2019    CC:  Chief Complaint   Patient presents with     Acne     currently using differin gel nightly, ocp, 3.5% bpo wash in the am and cetaphil wash in the pm       History of Present Illness:  Ms. Marcy Diaz is a 34 year old female who presents as follow-up for acne. She was last seen on 5/7/19 for acne treatment. Her current treatment regimen is OCP (Lauren), BPO 3.5% wash, differin 0.1% gel nightly, and Cetaphil wash in the shower.  Today she reports she is unable to tell if there is improvement since her last visit. Her chin is still very effected, almost always has a pimple in this area, often deep tender cystic pimples. She reports she is done having children and breast feeding. She is wondering about treatment with Accutane as she saw great improvement in family members who were on the medication in their teens. No other concerns addressed today.    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    Family History:  Father has history of SCC.   Reviewed and left in chart for clinician convenience.       Medications:  Current Outpatient Medications   Medication Sig Dispense Refill     drospirenone-ethinyl estradiol (LAUREN) 3-0.02 MG tablet Take 1 tablet by mouth daily 28 tablet 11        No Known Allergies    Review of Systems:  -Constitutional: Patient is otherwise feeling well, in usual state of health.   -Skin: As above in HPI. No additional skin concerns.    Physical exam:  Vitals: There were no vitals taken for this visit.  GEN: This is a well developed, well-nourished female in no acute distress, in a pleasant mood.   SKIN: Acne exam, which includes the face, neck, upper central chest, and upper central back was performed.  - Huff Type II  - 1 cystic acne lesion on the chin with 3 imflamatory papules   - minimal comedonal acne on the forehead   - No other lesions of concern on areas examined.       Impression/Plan:    1. Acne vulgaris. Some improvement from OCP, differin 1% gel, and BPO wash, but still having significant hormonal acne on the chin. Discussed the hormonal component of her acne and recommended adding Spironolactone to the treatment plan rather than starting Accutane. I like to try spironolactone first as I have noticed that when hormonal acne is treated with accutane, there is a higher chance of relapse after discontinuing. Discussed time course to see improvement.     In the morning: Recommended Neutrogena 3.5% clear pore BPO wash.     At night: recommended gentle facial cleanser with otc Differin 0.1% gel.     Continue a combined birth control with drospirenone. Discussed risk of blood clots previously. Recommended annual follow with an ob/gyn for appropriate gyn examinations/testing.     Prescribed Spironolactone 50 mg daily to begin. After 1 week we will increase to 100 mg daily. Patient aware she should not get pregnant while on this medication. Discussed side effects including breast tenderness, dizziness with standing, headaches, muscle cramps, spotting, tetragenic effects.     Will follow in 3 months    Follow-up 3 months, sooner for lesions of concern.       Staff Involved:  Scribe/Staff    Scribe Disclosure  I, Anahi Lunsford, am serving as a scribe to  document services personally performed by Dr. Diya Cui MD, based on data collection and the provider's statements to me.     Provider Disclosure:   The documentation recorded by the scribe accurately reflects the services I personally performed and the decisions made by me.    Diya Cui MD    Department of Dermatology  [facility] [place]: Phone: [phone], Fax:[phone]  [facility]: Phone: [phone], Fax: [phone]

## 2019-08-08 NOTE — LETTER
8/8/2019         RE: Marcy Diaz  [address] [address]        Dear Colleague,    Thank you for referring your patient, Marcy Diaz, to the [facility]. Please see a copy of my visit note below.    [facility] Dermatology Note      Dermatology Problem List:  1. Acne, hormonal  - s/p spironolactone 100 mg daily, BPO wash, clindamycin, tretinoin 0.025% cream  - current tx: OCP (Ambar), BPO wash, differin 0.1% gel, spironolactone 100 mg daily    Encounter Date: Aug 8, 2019    CC:  Chief Complaint   Patient presents with     Acne     currently using differin gel nightly, ocp, 3.5% bpo wash in the am and cetaphil wash in the pm       History of Present Illness:  Ms. Marcy Diaz is a 34 year old female who presents as follow-up for acne. She was last seen on 5/7/19 for acne treatment. Her current treatment regimen is OCP (Ambar), BPO 3.5% wash, differin 0.1% gel nightly, and Cetaphil wash in the shower.  Today she reports she is unable to tell if there is improvement since her last visit. Her chin is still very effected, almost always has a pimple in this area, often deep tender cystic pimples. She reports she is done having children and breast feeding. She is wondering about treatment with Accutane as she saw great improvement in family members who were on the medication in their teens. No other concerns addressed today.    Past Medical History:   Patient Active Problem List   Diagnosis     Acne     CARDIOVASCULAR SCREENING; LDL GOAL LESS THAN 160     Past Medical History:   Diagnosis Date     Acne      ASCUS favor benign 2013    neg HPV; resolved     Depression with anxiety 2005     Past Surgical History:   Procedure Laterality Date     EXTRACTION(S) DENTAL       EYE SURGERY  1985 & 1998    strabismus correction       Social History:  Patient is an NP at [employer]. No plans for more children in the future.    Family History:  Father has history  of SCC.   Reviewed and left in chart for clinician convenience.       Medications:  Current Outpatient Medications   Medication Sig Dispense Refill     drospirenone-ethinyl estradiol (LAUREN) 3-0.02 MG tablet Take 1 tablet by mouth daily 28 tablet 11       No Known Allergies    Review of Systems:  -Constitutional: Patient is otherwise feeling well, in usual state of health.   -Skin: As above in HPI. No additional skin concerns.    Physical exam:  Vitals: There were no vitals taken for this visit.  GEN: This is a well developed, well-nourished female in no acute distress, in a pleasant mood.   SKIN: Acne exam, which includes the face, neck, upper central chest, and upper central back was performed.  - Huff Type II  - 1 cystic acne lesion on the chin with 3 imflamatory papules   - minimal comedonal acne on the forehead   - No other lesions of concern on areas examined.       Impression/Plan:    1. Acne vulgaris. Some improvement from OCP, differin 1% gel, and BPO wash, but still having significant hormonal acne on the chin. Discussed the hormonal component of her acne and recommended adding Spironolactone to the treatment plan rather than starting Accutane. I like to try spironolactone first as I have noticed that when hormonal acne is treated with accutane, there is a higher chance of relapse after discontinuing. Discussed time course to see improvement.     In the morning: Recommended Neutrogena 3.5% clear pore BPO wash.     At night: recommended gentle facial cleanser with otc Differin 0.1% gel.     Continue a combined birth control with drospirenone. Discussed risk of blood clots previously. Recommended annual follow with an ob/gyn for appropriate gyn examinations/testing.     Prescribed Spironolactone 50 mg daily to begin. After 1 week we will increase to 100 mg daily. Patient aware she should not get pregnant while on this medication. Discussed side effects including breast tenderness, dizziness with  standing, headaches, muscle cramps, spotting, tetragenic effects.     Will follow in 3 months    Follow-up 3 months, sooner for lesions of concern.       Staff Involved:  Scribe/Staff    Scribe Disclosure  I, Anahi Lunsford, am serving as a scribe to document services personally performed by Dr. Diya Cui MD, based on data collection and the provider's statements to me.     Provider Disclosure:   The documentation recorded by the scribe accurately reflects the services I personally performed and the decisions made by me.    Diya Cui MD    Department of Dermatology  [facility]: Phone: [phone], Fax:[phone]  [facility]: Phone: [phone], Fax: [phone]              Again, thank you for allowing me to participate in the care of your patient.        Sincerely,        Diya Cui MD

## 2019-08-08 NOTE — NURSING NOTE
@Marcy Diaz's goals for this visit include:   Chief Complaint   Patient presents with     Acne     currently using differin gel nightly, ocp, 3.5% bpo wash in the am and cetaphil wash in the pm       She requests these members of her care team be copied on today's visit information: NO    PCP: Neelam Ahmadi    Referring Provider:  No referring provider defined for this encounter.    There were no vitals taken for this visit.    Do you need any medication refills at today's visit? NO    Sabrina Sahbazz, [facility]

## 2019-09-10 ENCOUNTER — TRANSFERRED RECORDS (OUTPATIENT)
Dept: HEALTH INFORMATION MANAGEMENT | Facility: CLINIC | Age: 34
End: 2019-09-10

## 2019-09-13 ENCOUNTER — TRANSFERRED RECORDS (OUTPATIENT)
Dept: HEALTH INFORMATION MANAGEMENT | Facility: CLINIC | Age: 34
End: 2019-09-13

## 2019-10-30 ENCOUNTER — TELEPHONE (OUTPATIENT)
Dept: DERMATOLOGY | Facility: CLINIC | Age: 34
End: 2019-10-30

## 2019-10-30 NOTE — TELEPHONE ENCOUNTER
Left message for patient to call [phone].  Was going to offer patient to see Mihaela for follow up.    Sabrina Shabazz CMA

## 2019-10-30 NOTE — TELEPHONE ENCOUNTER
[facility] Call Center    Phone Message    May a detailed message be left on voicemail: yes    Reason for Call: Medication Question or concern regarding medication   Prescription Clarification  Name of Medication: Spironolactone and birth control  Prescribing Provider: Dr. Cui   What on the order needs clarification? Pt had to reschedule her appt that was scheduled for 11/14. Next available with Dr. Cui was 1/9/2019. Pt is requesting a call back from care team to discuss how her medications will be able to be refilled when she needs them in about 3 weeks. Please advise.    Action Taken: Message routed to:  Adult Clinics: Dermatology p [id number]

## 2019-11-06 ENCOUNTER — OFFICE VISIT (OUTPATIENT)
Dept: DERMATOLOGY | Facility: CLINIC | Age: 34
End: 2019-11-06
Payer: COMMERCIAL

## 2019-11-06 DIAGNOSIS — L70.0 ACNE VULGARIS: Primary | ICD-10-CM

## 2019-11-06 PROCEDURE — 99213 OFFICE O/P EST LOW 20 MIN: CPT | Performed by: PHYSICIAN ASSISTANT

## 2019-11-06 RX ORDER — SPIRONOLACTONE 50 MG/1
150 TABLET, FILM COATED ORAL DAILY
Qty: 90 TABLET | Refills: 2 | Status: SHIPPED | OUTPATIENT
Start: 2019-11-06 | End: 2020-02-12

## 2019-11-06 NOTE — PROGRESS NOTES
[facility] Dermatology Note      Dermatology Problem List:  1. Acne, hormonal  - s/p spironolactone 100 mg daily, BPO 3% wash, differin 0.1% cream, tretinoin 0.025% cream, clindamycin 1%   - current tx: OCP (Lauren), BPO wash, differin 0.1% gel, spironolactone 150 mg daily,    CC:   Chief Complaint   Patient presents with     Derm Problem     acne         Encounter Date: Nov 6, 2019    History of Present Illness:  Ms. Marcy Diaz is a 34 year old female who returns regarding acne. She was started on spironolactone 100mg po every day 3mo and has noticed some improvement in her acne. She continues with Lauren daily. She then has only been using the OTC Differin 0.1% gel at night. She does not use the clindamycin. She has noticed some dryness around her lips. No spotting or breast tenderness. SHe is tolerating the medication well. She is still interested in isotretinoin as her family members have had good success with it in the past.     Past Medical History:   Patient Active Problem List   Diagnosis     Acne     CARDIOVASCULAR SCREENING; LDL GOAL LESS THAN 160     Past Medical History:   Diagnosis Date     Acne      ASCUS favor benign 2013    neg HPV; resolved     Depression with anxiety 2005     Past Surgical History:   Procedure Laterality Date     EXTRACTION(S) DENTAL       EYE SURGERY  1985 & 1998    strabismus correction       Social History:  Mother of two children    Family History:  Not obtained today.    Medications:  Current Outpatient Medications   Medication Sig Dispense Refill     drospirenone-ethinyl estradiol (LAUREN) 3-0.02 MG tablet Take 1 tablet by mouth daily 28 tablet 11     spironolactone (ALDACTONE) 50 MG tablet Take 2 tablets (100 mg) by mouth daily 60 tablet 2     No Known Allergies      Review of Systems:  -GI: The patient denies nausea, vomiting, diarrhea or abdominal pain.  -: No changes in menstrual cycle, no breast tenderness, no change in urination  -CVS: no palpitations,  dizziness  -Constitutional: The patient denies fatigue, fevers, chills, unintended weight loss, and night sweats.  -HEENT: Patient denies nonhealing oral sores.  -Skin: As above in HPI. No additional skin concerns.    Physical exam:  Vitals: There were no vitals taken for this visit.  GEN: This is a well developed, well-nourished female in no acute distress, in a pleasant mood.    SKIN: Acne exam, which includes the face, neck, upper central chest, and upper central back was performed.  -Acneiform scarring is noted on the chin, no active acne lesions on today's exam.   -No other lesions of concern on areas examined.     Impression/Plan:  1. Acne vulgaris, hormonal    Will increase dose of spironolactone to 150mg po every day for another 3mo - If patient is still having breakouts in 3mo will consider course of isotretinoin. Patient understands that hormonal acne is somewhat less responsive to isotretinoin and she may continue to get breakouts despite isotretinoin use.    Continue differin 0.1% gel    Continue cetaphil/cerave moisturizer and cleanser    Can alternate with BPO 3% cleanser as well    Aquaphor and/or Vaseline to lips nightly    Briefly discussed fraxel laser tx for scarring on the lower face      CC Dr. Canseco on close of this encounter.  Follow-up in 3 months, earlier for new or changing lesions.       Staff Involved:  Staff Only  All risks, benefits and alternatives were discussed with patient.  Patient is in agreement and understands the assessment and plan.  All questions were answered.    Mihaela John PA-C  [facility]: Phone: [phone], Fax: [phone]

## 2019-11-06 NOTE — NURSING NOTE
Marcy Diaz's goals for this visit include: currently using differin gel nightly, ocp, 3.5% bpo wash in the am and cetaphil wash in the pm    She requests these members of her care team be copied on today's visit information: no    PCP: Neelam Ahmadi    Referring Provider:  No referring provider defined for this encounter.    There were no vitals taken for this visit.    Do you need any medication refills at today's visit? No    Lissette Cooper LPN

## 2019-11-06 NOTE — LETTER
11/6/2019         RE: Marcy Diaz  [address] [address]        Dear Colleague,    Thank you for referring your patient, Marcy Diaz, to the [facility]. Please see a copy of my visit note below.    [facility] Dermatology Note      Dermatology Problem List:  1. Acne, hormonal  - s/p spironolactone 100 mg daily, BPO 3% wash, differin 0.1% cream, tretinoin 0.025% cream, clindamycin 1%   - current tx: OCP (Lauren), BPO wash, differin 0.1% gel, spironolactone 150 mg daily,    CC:   Chief Complaint   Patient presents with     Derm Problem     acne         Encounter Date: Nov 6, 2019    History of Present Illness:  Ms. Marcy Diaz is a 34 year old female who returns regarding acne. She was started on spironolactone 100mg po every day 3mo and has noticed some improvement in her acne. She continues with Lauren daily. She then has only been using the OTC Differin 0.1% gel at night. She does not use the clindamycin. She has noticed some dryness around her lips. No spotting or breast tenderness. SHe is tolerating the medication well. She is still interested in isotretinoin as her family members have had good success with it in the past.     Past Medical History:   Patient Active Problem List   Diagnosis     Acne     CARDIOVASCULAR SCREENING; LDL GOAL LESS THAN 160     Past Medical History:   Diagnosis Date     Acne      ASCUS favor benign 2013    neg HPV; resolved     Depression with anxiety 2005     Past Surgical History:   Procedure Laterality Date     EXTRACTION(S) DENTAL       EYE SURGERY  1985 & 1998    strabismus correction       Social History:  Mother of two children    Family History:  Not obtained today.    Medications:  Current Outpatient Medications   Medication Sig Dispense Refill     drospirenone-ethinyl estradiol (LAUREN) 3-0.02 MG tablet Take 1 tablet by mouth daily 28 tablet 11     spironolactone (ALDACTONE) 50 MG tablet Take 2 tablets (100 mg) by  mouth daily 60 tablet 2     No Known Allergies      Review of Systems:  -GI: The patient denies nausea, vomiting, diarrhea or abdominal pain.  -: No changes in menstrual cycle, no breast tenderness, no change in urination  -CVS: no palpitations, dizziness  -Constitutional: The patient denies fatigue, fevers, chills, unintended weight loss, and night sweats.  -HEENT: Patient denies nonhealing oral sores.  -Skin: As above in HPI. No additional skin concerns.    Physical exam:  Vitals: There were no vitals taken for this visit.  GEN: This is a well developed, well-nourished female in no acute distress, in a pleasant mood.    SKIN: Acne exam, which includes the face, neck, upper central chest, and upper central back was performed.  -Acneiform scarring is noted on the chin, no active acne lesions on today's exam.   -No other lesions of concern on areas examined.     Impression/Plan:  1. Acne vulgaris, hormonal    Will increase dose of spironolactone to 150mg po every day for another 3mo - If patient is still having breakouts in 3mo will consider course of isotretinoin. Patient understands that hormonal acne is somewhat less responsive to isotretinoin and she may continue to get breakouts despite isotretinoin use.    Continue differin 0.1% gel    Continue cetaphil/cerave moisturizer and cleanser    Can alternate with BPO 3% cleanser as well    Aquaphor and/or Vaseline to lips nightly    Briefly discussed fraxel laser tx for scarring on the lower face      CC Dr. Canseco on close of this encounter.  Follow-up in 3 months, earlier for new or changing lesions.       Staff Involved:  Staff Only  All risks, benefits and alternatives were discussed with patient.  Patient is in agreement and understands the assessment and plan.  All questions were answered.    Mihaela John PA-C  [facility] [place]: Phone: [phone], Fax: [phone]              Again,  thank you for allowing me to participate in the care of your patient.        Sincerely,        Mihaela John PA-C

## 2019-11-12 ENCOUNTER — OFFICE VISIT (OUTPATIENT)
Dept: OPHTHALMOLOGY | Facility: CLINIC | Age: 34
End: 2019-11-12
Attending: OPHTHALMOLOGY
Payer: COMMERCIAL

## 2019-11-12 DIAGNOSIS — R29.891 OCULAR TORTICOLLIS: ICD-10-CM

## 2019-11-12 DIAGNOSIS — H50.15 ALTERNATING EXOTROPIA: Primary | ICD-10-CM

## 2019-11-12 DIAGNOSIS — H55.02 LATENT NYSTAGMUS: ICD-10-CM

## 2019-11-12 DIAGNOSIS — H50.21 HYPERTROPIA OF RIGHT EYE: ICD-10-CM

## 2019-11-12 PROCEDURE — G0463 HOSPITAL OUTPT CLINIC VISIT: HCPCS | Mod: 25,ZF

## 2019-11-12 PROCEDURE — 92060 SENSORIMOTOR EXAMINATION: CPT | Mod: ZF | Performed by: OPHTHALMOLOGY

## 2019-11-12 PROCEDURE — 92015 DETERMINE REFRACTIVE STATE: CPT | Mod: ZF

## 2019-11-12 ASSESSMENT — REFRACTION_WEARINGRX
OS_AXIS: 095
OD_CYLINDER: +1.00
OS_CYLINDER: +1.00
OS_SPHERE: -2.25
SPECS_TYPE: SVL
OD_AXIS: 085
OD_SPHERE: -1.25

## 2019-11-12 ASSESSMENT — VISUAL ACUITY
OS_CC: 20/20
CORRECTION_TYPE: GLASSES
OS_CC+: -1
OD_CC: 20/20
OD_CC+: +2
METHOD: SNELLEN - LINEAR

## 2019-11-12 ASSESSMENT — CONF VISUAL FIELD
OS_NORMAL: 1
METHOD: TOYS
OD_NORMAL: 1

## 2019-11-12 ASSESSMENT — SLIT LAMP EXAM - LIDS
COMMENTS: NORMAL
COMMENTS: NORMAL

## 2019-11-12 ASSESSMENT — REFRACTION
OD_SPHERE: -1.25
OS_AXIS: 080
OD_AXIS: 090
OS_CYLINDER: +1.00
OS_SPHERE: -2.00
OD_CYLINDER: +0.75

## 2019-11-12 ASSESSMENT — CUP TO DISC RATIO
OD_RATIO: 0.2
OS_RATIO: 0.2

## 2019-11-12 ASSESSMENT — EXTERNAL EXAM - LEFT EYE: OS_EXAM: NORMAL

## 2019-11-12 ASSESSMENT — TONOMETRY: IOP_METHOD: BOTH EYES NORMAL BY PALPATION

## 2019-11-12 ASSESSMENT — EXTERNAL EXAM - RIGHT EYE: OD_EXAM: NORMAL

## 2019-11-12 NOTE — NURSING NOTE
Chief Complaint(s) and History of Present Illness(es)     Exotropia Evaluation     Laterality: both eyes    Onset: present since childhood    Quality: horizontal    Frequency: intermittently    Course: gradually worsening    Associated symptoms: Negative for eye pain, blurred vision and head tilt    Treatments tried: patching, surgery and glasses              Comments     Here for second opinion on strabismus surgery. Previously seen at [facility] where they rec Sx. As a child she patched, worse glasses, and had 2 previous strab surgeries from Dr. Garcia. The first surgery was within her fist year of life for ET and her second surgery for ET was in 1998. When she was a child the LE would drift in all the time. Now both eyes, LE>RE, drifts out intermittently. She feels the drifting is worse when she tired. Has distance glasses and uses them mainly when driving. She feels she tilts her head but unsure what side. She first noticed the XT about one year ago. No double vision. Has FHx of thyroid disease.

## 2019-11-12 NOTE — PATIENT INSTRUCTIONS
"To schedule surgery, call Jamia Ndiaye at [phone].    Read more about your strabismus surgery for exotropia and right hypertropia online at: [email or web address]. Dr. Diez is a member of the American Association for Pediatric Ophthalmology and Strabismus, an international organization of physicians (doctors with an \"MD\" degree) with specialized training and experience in providing state-of-the-art medical and surgical eye care for children.     For a free and informative book on strabismus (eye misalignment disorders) and strabismus surgery, go to:  [email or web address] (Written by Dr. Garcia)    Please come in for your preop visit at our clinic with the patch on for an hour.     I recommend eye muscle surgery. Today with Marcy, I reviewed the indications, risks, benefits, and alternatives of eye muscle surgery including, but not limited to, failure obtain the desired ocular alignment (\"over\" or \"under\" correction), diplopia, and damage to any structure in or around the eye that may necessitate treatment with medicine, laser, or surgery. I further explained that the goal of surgery is to help control Marcy's strabismus. Surgery will not \"cure\" Marcy's strabismus or resolve/prevent the need for refractive correction. Additional strabismus surgery may be required in the short or long term. I emphasized that regular follow-up to monitor and optimize her vision and alignment would be necessary. We also discussed the risks of surgical injury, bleeding, and infection which may necessitate further medical or surgical treatment and which may result in diplopia, loss of vision, blindness, or loss of the eye(s) in less than 1% of cases and the remote possibility of permanent damage to any organ system or death with the use of general anesthesia.  I explained that we would hide visible scars as much as possible in natural creases but that every patient heals and pigments differently " resulting in a variable degree of scarring to the eyes or surrounding facial structures after surgery.  I provided multiple opportunities for questions, answered all questions to the best of my ability, and confirmed that my answers and my discussion were understood.

## 2019-11-12 NOTE — PROGRESS NOTES
Chief Complaint(s) and History of Present Illness(es)     Exotropia Evaluation     In both eyes.  Disease is present since childhood.  Characterized as horizontal.  Occurring intermittently.  Since onset it is gradually worsening.  Associated symptoms include Negative for eye pain, blurred vision and head tilt.  Treatments tried include patching, surgery and glasses.              Comments     Here for second opinion on strabismus surgery. Previously seen at [facility] where they rec Sx. As a child she patched, worse glasses, and had 2 previous strab surgeries from Dr. Garcia. The first surgery was within her fist year of life for ET ([id number])  and her second surgery for XT was in 1998. When she was a child the LE would drift in all the time. Now both eyes, LE>RE, drifts out intermittently. She feels the drifting is worse when she tired. Has distance glasses and uses them mainly when driving. She feels she tilts her head but unsure what side, longstanding. She first noticed the XT about one year ago. No double vision. Has FHx of thyroid disease.     In the past year  was noticing exotropia and then she noticed the exotropia drifting left eye mostly. Also seen in photos. Appearance is very bothersome. Causes insecurity about the exotropia.               History is obtained from the patient. Review of systems for the eyes was negative other than the pertinent positives and negatives noted in the HPI.     Primary care: Neelam Ahmadi   Referring provider: Referred Self  [facility] is home  Assessment & Plan   Marcy Diaz is a 34 year old female who presents with:     Alternating exotropia  Hypertropia of right eye  Ocular torticollis  Latent nystagmus  Marcy has a past ocular history of esotropia s/p eye muscle surgery with Dr. Renato Garcia in her first year of life and then had eye muscle surgery for consecutive exotropia in 1998 with Dr. Garcia. She has gradually noticed increased  "exotropia that is bothersome for psychosocial reasons, for the past year. No double vision.     Marcy has excellent 20/20 visual acuity each eye. She is a non-fuser without stereo. 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 She has evidence of her prior eye muscle surgery on slit lamp exam over the nasal and temporal recti insertions suggestive of prior recessions. Her dilated fundus exam is healthy and her cycloplegic refraction is essentially stable to her current glasses prescription.  - Updated glasses prescription provided.   - I recommend eye muscle surgery. Today with Marcy, I reviewed the indications, risks, benefits, and alternatives of eye muscle surgery including, but not limited to, failure obtain the desired ocular alignment (\"over\" or \"under\" correction), diplopia, and damage to any structure in or around the eye that may necessitate treatment with medicine, laser, or surgery. I further explained that the goal of surgery is to help control Marcy's strabismus. Surgery will not \"cure\" Marcy's strabismus or resolve/prevent the need for refractive correction. Additional strabismus surgery may be required in the short or long term. I emphasized that regular follow-up to monitor and optimize her vision and alignment would be necessary. We also discussed the risks of surgical injury, bleeding, and infection which may necessitate further medical or surgical treatment and which may result in diplopia, loss of vision, blindness, or loss of the eye(s) in less than 1% of cases and the remote possibility of permanent damage to any organ system or death with the use of general anesthesia.  I explained that we would hide visible scars as much as possible in natural creases but that every patient heals and pigments differently resulting in a variable degree of scarring to the eyes or surrounding facial structures after surgery.  I provided multiple " "opportunities for questions, answered all questions to the best of my ability, and confirmed that my answers and my discussion were understood.  - Aric patch test preop.  - Outside records request for op notes.       Return for Preop measurements - Aric. Measure all gaze positions looking for any alternating hypertropia  Plan: RIOc + [employer] for XT brought out on Aric    Patient Instructions   To schedule surgery, call Jamia Ndiaye at [phone].    Read more about your strabismus surgery for exotropia and right hypertropia online at: [email or web address]. Dr. Diez is a member of the American Association for Pediatric Ophthalmology and Strabismus, an international organization of physicians (doctors with an \"MD\" degree) with specialized training and experience in providing state-of-the-art medical and surgical eye care for children.     For a free and informative book on strabismus (eye misalignment disorders) and strabismus surgery, go to:  [email or web address] (Written by Dr. Garcia)    Please come in for your preop visit at our clinic with the patch on for an hour.     I recommend eye muscle surgery. Today with Marcy, I reviewed the indications, risks, benefits, and alternatives of eye muscle surgery including, but not limited to, failure obtain the desired ocular alignment (\"over\" or \"under\" correction), diplopia, and damage to any structure in or around the eye that may necessitate treatment with medicine, laser, or surgery. I further explained that the goal of surgery is to help control Marcy's strabismus. Surgery will not \"cure\" Marcy's strabismus or resolve/prevent the need for refractive correction. Additional strabismus surgery may be required in the short or long term. I emphasized that regular follow-up to monitor and optimize her vision and alignment would be necessary. We also discussed the risks of surgical injury, bleeding, and infection which may " necessitate further medical or surgical treatment and which may result in diplopia, loss of vision, blindness, or loss of the eye(s) in less than 1% of cases and the remote possibility of permanent damage to any organ system or death with the use of general anesthesia.  I explained that we would hide visible scars as much as possible in natural creases but that every patient heals and pigments differently resulting in a variable degree of scarring to the eyes or surrounding facial structures after surgery.  I provided multiple opportunities for questions, answered all questions to the best of my ability, and confirmed that my answers and my discussion were understood.          Visit Diagnoses & Orders    ICD-10-CM    1. Alternating exotropia H50.15 Sensorimotor     Case Request: Bilateral strabismus surgery and any indicated procedure(s)     Case Request: Bilateral strabismus surgery and any indicated procedure(s)   2. Hypertropia of right eye H50.21 Sensorimotor     Case Request: Bilateral strabismus surgery and any indicated procedure(s)     Case Request: Bilateral strabismus surgery and any indicated procedure(s)   3. Ocular torticollis R29.891 Sensorimotor   4. Latent nystagmus H55.02 Sensorimotor      Attending Physician Attestation:  Complete documentation of historical and exam elements from today's encounter can be found in the full encounter summary report (not reduplicated in this progress note).  I personally obtained the chief complaint(s) and history of present illness.  I confirmed and edited as necessary the review of systems, past medical/surgical history, family history, social history, and examination findings as documented by others; and I examined the patient myself.  I personally reviewed the relevant tests, images, and reports as documented above.  I formulated and edited as necessary the assessment and plan and discussed the findings and management plan with the patient and family. Maria Elena Mcknight  MD Chary

## 2019-11-12 NOTE — LETTER
"11/12/2019    To: Neelam Ahmadi, APRN CNP [address] [facility] [address]    Re:  Marcy Diaz    YOB: 1985    MRN: [id number]    Dear Colleague,     It was my pleasure to see Marcy on 11/12/2019.  In summary, Marcy Diaz is a 34 year old female who presents with:     Alternating exotropia  Hypertropia of right eye  Ocular torticollis  Latent nystagmus   Marcy has a history of esotropia s/p eye muscle surgery with Dr. Garcia in her first year of life and eye muscle surgery for consecutive exotropia in 1998. She has gradually noticed increased exotropia that is bothersome for psychosocial reasons, for the past year. No double vision.   Marcy has excellent 20/20 visual acuity each eye. She is a non-fuser without stereo. 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 She has evidence of her prior eye muscle surgery on slit lamp exam over the nasal and temporal recti insertions suggestive of prior recessions. Her dilated fundus exam is healthy and her cycloplegic refraction is essentially stable to her current glasses prescription.  - I recommend eye muscle surgery. Today with Marcy, I reviewed the indications, risks, benefits, and alternatives of eye muscle surgery including, but not limited to, failure obtain the desired ocular alignment (\"over\" or \"under\" correction), diplopia, and damage to any structure in or around the eye that may necessitate treatment with medicine, laser, or surgery. I further explained that the goal of surgery is to help control Marcy's strabismus. Surgery will not \"cure\" Marcy's strabismus or resolve/prevent the need for refractive correction. Additional strabismus surgery may be required in the short or long term. I emphasized that regular follow-up to monitor and optimize her vision and alignment would be necessary. We also discussed the risks of surgical injury, bleeding, and infection which may " necessitate further medical or surgical treatment and which may result in diplopia, loss of vision, blindness, or loss of the eye(s) in less than 1% of cases and the remote possibility of permanent damage to any organ system or death with the use of general anesthesia.  I explained that we would hide visible scars as much as possible in natural creases but that every patient heals and pigments differently resulting in a variable degree of scarring to the eyes or surrounding facial structures after surgery.  I provided multiple opportunities for questions, answered all questions to the best of my ability, and confirmed that my answers and my discussion were understood.  - Updated glasses prescription provided. Aric patch test preop. Outside records request for op notes.     Thank you for the opportunity to care for Marcy. I have asked her to Return for Preop measurements - Aric.  Until then, please do not hesitate to contact me or my clinic with any questions or concerns.        Warm regards,          Roxanna Diez MD         , Pediatric Ophthalmology & Strabismus        Department of Ophthalmology & Visual Neurosciences  CC:  Marcy Diaz

## 2020-01-06 ENCOUNTER — OFFICE VISIT (OUTPATIENT)
Dept: PEDIATRICS | Facility: CLINIC | Age: 35
End: 2020-01-06
Payer: COMMERCIAL

## 2020-01-06 VITALS
DIASTOLIC BLOOD PRESSURE: 68 MMHG | OXYGEN SATURATION: 100 % | BODY MASS INDEX: 18.64 KG/M2 | HEART RATE: 87 BPM | WEIGHT: 116 LBS | TEMPERATURE: 98.3 F | SYSTOLIC BLOOD PRESSURE: 97 MMHG | HEIGHT: 66 IN

## 2020-01-06 DIAGNOSIS — L70.0 ACNE VULGARIS: ICD-10-CM

## 2020-01-06 DIAGNOSIS — H50.15 ALTERNATING EXOTROPIA: ICD-10-CM

## 2020-01-06 DIAGNOSIS — Z01.818 PREOP GENERAL PHYSICAL EXAM: Primary | ICD-10-CM

## 2020-01-06 DIAGNOSIS — H50.21 HYPERTROPIA OF RIGHT EYE: ICD-10-CM

## 2020-01-06 DIAGNOSIS — E87.8 ELECTROLYTE ABNORMALITY: ICD-10-CM

## 2020-01-06 LAB
ANION GAP SERPL CALCULATED.3IONS-SCNC: 5 MMOL/L (ref 3–14)
BUN SERPL-MCNC: 12 MG/DL (ref 7–30)
CALCIUM SERPL-MCNC: 8.4 MG/DL (ref 8.5–10.1)
CHLORIDE SERPL-SCNC: 105 MMOL/L (ref 94–109)
CO2 SERPL-SCNC: 28 MMOL/L (ref 20–32)
CREAT SERPL-MCNC: 0.66 MG/DL (ref 0.52–1.04)
GFR SERPL CREATININE-BSD FRML MDRD: >90 ML/MIN/{1.73_M2}
GLUCOSE SERPL-MCNC: 87 MG/DL (ref 70–99)
POTASSIUM SERPL-SCNC: 4.1 MMOL/L (ref 3.4–5.3)
SODIUM SERPL-SCNC: 138 MMOL/L (ref 133–144)

## 2020-01-06 PROCEDURE — 99215 OFFICE O/P EST HI 40 MIN: CPT | Performed by: FAMILY MEDICINE

## 2020-01-06 PROCEDURE — 36415 COLL VENOUS BLD VENIPUNCTURE: CPT | Performed by: FAMILY MEDICINE

## 2020-01-06 PROCEDURE — 80048 BASIC METABOLIC PNL TOTAL CA: CPT | Performed by: FAMILY MEDICINE

## 2020-01-06 ASSESSMENT — MIFFLIN-ST. JEOR: SCORE: 1237.92

## 2020-01-06 ASSESSMENT — PAIN SCALES - GENERAL: PAINLEVEL: NO PAIN (0)

## 2020-01-06 NOTE — PROGRESS NOTES
[address] [address]  [phone]  Dept: [phone]    PRE-OP EVALUATION:  Today's date: 2020    Marcy Diaz (: 1985) presents for pre-operative evaluation assessment as requested by Dr. Diez.  She requires evaluation and anesthesia risk assessment prior to undergoing surgery/procedure for treatment of alternating exotropia, hypertropia of right eye .    Proposed Surgery/ Procedure: bilateral strabismus surgery and any indicated procedures  Date of Surgery/ Procedure: 2020  Time of Surgery/ Procedure: 1410  Hospital/Surgical Facility:  OR  Available via [employer]  Primary Physician: Neelam Ahmadi  Type of Anesthesia Anticipated: General    Patient has a Health Care Directive or Living Will:  NO    1. NO - Do you have a history of heart attack, stroke, stent, bypass or surgery on an artery in the head, neck, heart or legs?  2. NO - Do you ever have any pain or discomfort in your chest?  3. NO - Do you have a history of  Heart Failure?  4. NO - Are you troubled by shortness of breath when: walking on the level, up a slight hill or at night?  5. NO - Do you currently have a cold, bronchitis or other respiratory infection?  6. NO - Do you have a cough, shortness of breath or wheezing?  7. NO - Do you sometimes get pains in the calves of your legs when you walk?  8. NO - Do you or anyone in your family have previous history of blood clots?  9. NO - Do you or does anyone in your family have a serious bleeding problem such as prolonged bleeding following surgeries or cuts?  10. NO - Have you ever had problems with anemia or been told to take iron pills?  11. NO - Have you had any abnormal blood loss such as black, tarry or bloody stools, or abnormal vaginal bleeding?  12. NO - Have you ever had a blood transfusion?  13. NO - Have you or any of your relatives ever had problems with anesthesia?  14. NO - Do you have sleep apnea, excessive  snoring or daytime drowsiness?  15. NO - Do you have any prosthetic heart valves?  16. NO - Do you have prosthetic joints?  17. NO - Is there any chance that you may be pregnant?      HPI:     HPI related to upcoming procedure: Patient with a history of ocular history of esotropia s/p eye muscle surgery with Dr. Renato Garcia in her first year of life and then had eye muscle surgery for consecutive exotropia in 1998 with Dr. Garcia. She has gradually noticed increased exotropia that is bothersome for psychosocial reasons, for the past year. She is scheduled for the above procedure.    PMH of acne but stable on oral OCP and spironolactone.      MEDICAL HISTORY:     Patient Active Problem List    Diagnosis Date Noted     Alternating exotropia 11/12/2019     Priority: Medium     Added automatically from request for surgery [id number]       Hypertropia of right eye 11/12/2019     Priority: Medium     Added automatically from request for surgery [id number]       Acne 07/29/2013     Priority: Medium     CARDIOVASCULAR SCREENING; LDL GOAL LESS THAN 160 07/29/2013     Priority: Medium      Past Medical History:   Diagnosis Date     Acne      ASCUS favor benign 2013    neg HPV; resolved     Depression with anxiety 2005     Exotropia      Past Surgical History:   Procedure Laterality Date     EXTRACTION(S) DENTAL       EYE SURGERY  1985 & 1998    strabismus correction     Current Outpatient Medications   Medication Sig Dispense Refill     drospirenone-ethinyl estradiol (LAUREN) 3-0.02 MG tablet Take 1 tablet by mouth daily 28 tablet 11     spironolactone (ALDACTONE) 50 MG tablet Take 3 tablets (150 mg) by mouth daily 90 tablet 2     OTC products: None, except as noted above    No Known Allergies   Latex Allergy: NO    Social History     Tobacco Use     Smoking status: Never Smoker     Smokeless tobacco: Never Used   Substance Use Topics     Alcohol use: Yes     History   Drug Use No       REVIEW OF SYSTEMS:   CONSTITUTIONAL: NEGATIVE  "for fever, chills, change in weight  INTEGUMENTARY/SKIN: NEGATIVE for worrisome rashes, moles or lesions  EYES: As in HPI  ENT/MOUTH: NEGATIVE for ear, mouth and throat problems  RESP: NEGATIVE for significant cough or SOB  BREAST: NEGATIVE for masses, tenderness or discharge  CV: NEGATIVE for chest pain, palpitations or peripheral edema  GI: NEGATIVE for nausea, abdominal pain, heartburn, or change in bowel habits  : NEGATIVE for frequency, dysuria, or hematuria  MUSCULOSKELETAL: NEGATIVE for significant arthralgias or myalgia  NEURO: NEGATIVE for weakness, dizziness or paresthesias  ENDOCRINE: NEGATIVE for temperature intolerance, skin/hair changes  HEME: NEGATIVE for bleeding problems  PSYCHIATRIC: NEGATIVE for changes in mood or affect    EXAM:   BP 97/68   Pulse 87   Temp 98.3  F (36.8  C) (Oral)   Ht 1.676 m (5' 6\")   Wt 52.6 kg (116 lb)   LMP 12/16/2019   SpO2 100%   BMI 18.72 kg/m      GENERAL APPEARANCE: healthy, alert and no distress     EYES:Alternating exotropia, Hypertropia of right eye, Ocular torticollis, Latent nystagmus     HENT: ear canals and TM's normal and nose and mouth without ulcers or lesions     NECK: no adenopathy, no asymmetry, masses, or scars and thyroid normal to palpation     RESP: lungs clear to auscultation - no rales, rhonchi or wheezes     CV: regular rates and rhythm, normal S1 S2, no S3 or S4 and no murmur, click or rub     ABDOMEN:  soft, nontender, no HSM or masses and bowel sounds normal     MS: extremities normal- no gross deformities noted, no evidence of inflammation in joints, FROM in all extremities.     SKIN: no suspicious lesions or rashes     NEURO: Normal strength and tone, sensory exam grossly normal, mentation intact and speech normal     PSYCH: mentation appears normal. and affect normal/bright     LYMPHATICS: No cervical adenopathy    DIAGNOSTICS:       Recent Labs   Lab Test 12/22/17  1635 08/28/17  1549  04/13/15  0851   HGB 11.2* 13.0   < > 13.4   PLT "  --  228  --  245    < > = values in this interval not displayed.        IMPRESSION:   Marcy was seen today for pre-op exam.    Diagnoses and all orders for this visit:    Preop general physical exam    Alternating exotropia    Hypertropia of right eye    Acne vulgaris-->stable    Electrolyte abnormality-->on spironolactone for acne.  -     Basic metabolic panel; Future  -     Basic metabolic panel        The proposed surgical procedure is considered INTERMEDIATE risk.    REVISED CARDIAC RISK INDEX  The patient has the following serious cardiovascular risks for perioperative complications such as (MI, PE, VFib and 3  AV Block):  No serious cardiac risks  INTERPRETATION: 0 risks: Class I (very low risk - 0.4% complication rate)    The patient has the following additional risks for perioperative complications:  No identified additional risks      ICD-10-CM    1. Preop general physical exam Z01.818    2. Alternating exotropia H50.15    3. Hypertropia of right eye H50.21    4. Acne vulgaris L70.0    5. Electrolyte abnormality E87.8 Basic metabolic panel     Basic metabolic panel       RECOMMENDATIONS:       --Patient is to take all scheduled medications on the day of surgery EXCEPT for modifications listed below.  Spironolactone.    APPROVAL GIVEN to proceed with proposed procedure.     Signed Electronically by: Melly Christianson MD    Copy of this evaluation report is provided to requesting physician.    [facility] Preop Guidelines    Revised Cardiac Risk Index

## 2020-01-23 ENCOUNTER — HOSPITAL ENCOUNTER (OUTPATIENT)
Facility: CLINIC | Age: 35
Discharge: HOME | End: 2020-01-23
Attending: OPHTHALMOLOGY | Admitting: OPHTHALMOLOGY
Payer: COMMERCIAL

## 2020-01-23 ENCOUNTER — ANESTHESIA (OUTPATIENT)
Dept: SURGERY | Facility: CLINIC | Age: 35
End: 2020-01-23
Payer: COMMERCIAL

## 2020-01-23 ENCOUNTER — ANESTHESIA EVENT (OUTPATIENT)
Dept: SURGERY | Facility: CLINIC | Age: 35
End: 2020-01-23
Payer: COMMERCIAL

## 2020-01-23 ENCOUNTER — OFFICE VISIT (OUTPATIENT)
Dept: OPHTHALMOLOGY | Facility: CLINIC | Age: 35
End: 2020-01-23
Attending: OPHTHALMOLOGY
Payer: COMMERCIAL

## 2020-01-23 VITALS
HEART RATE: 82 BPM | TEMPERATURE: 98.4 F | SYSTOLIC BLOOD PRESSURE: 100 MMHG | RESPIRATION RATE: 18 BRPM | OXYGEN SATURATION: 98 % | HEIGHT: 66 IN | BODY MASS INDEX: 18 KG/M2 | WEIGHT: 111.99 LBS | DIASTOLIC BLOOD PRESSURE: 69 MMHG

## 2020-01-23 DIAGNOSIS — H50.15 ALTERNATING EXOTROPIA: ICD-10-CM

## 2020-01-23 DIAGNOSIS — H50.21 HYPERTROPIA OF RIGHT EYE: ICD-10-CM

## 2020-01-23 DIAGNOSIS — H50.15 ALTERNATING EXOTROPIA: Primary | ICD-10-CM

## 2020-01-23 DIAGNOSIS — Z98.890 POSTOPERATIVE EYE STATE: Primary | ICD-10-CM

## 2020-01-23 DIAGNOSIS — R29.891 OCULAR TORTICOLLIS: ICD-10-CM

## 2020-01-23 DIAGNOSIS — H55.02 LATENT NYSTAGMUS: ICD-10-CM

## 2020-01-23 LAB
GLUCOSE BLDC GLUCOMTR-MCNC: 78 MG/DL (ref 70–99)
HCG UR QL: NEGATIVE

## 2020-01-23 PROCEDURE — 25800030 ZZH RX IP 258 OP 636: Performed by: NURSE ANESTHETIST, CERTIFIED REGISTERED

## 2020-01-23 PROCEDURE — G0463 HOSPITAL OUTPT CLINIC VISIT: HCPCS | Mod: 25,ZF

## 2020-01-23 PROCEDURE — 71000014 ZZH RECOVERY PHASE 1 LEVEL 2 FIRST HR: Performed by: OPHTHALMOLOGY

## 2020-01-23 PROCEDURE — 25000125 ZZHC RX 250: Performed by: OPHTHALMOLOGY

## 2020-01-23 PROCEDURE — 27210794 ZZH OR GENERAL SUPPLY STERILE: Performed by: OPHTHALMOLOGY

## 2020-01-23 PROCEDURE — 36000057 ZZH SURGERY LEVEL 3 1ST 30 MIN - UMMC: Performed by: OPHTHALMOLOGY

## 2020-01-23 PROCEDURE — 36000059 ZZH SURGERY LEVEL 3 EA 15 ADDTL MIN UMMC: Performed by: OPHTHALMOLOGY

## 2020-01-23 PROCEDURE — 92285 EXTERNAL OCULAR PHOTOGRAPHY: CPT | Mod: ZF

## 2020-01-23 PROCEDURE — 25000128 H RX IP 250 OP 636: Performed by: NURSE ANESTHETIST, CERTIFIED REGISTERED

## 2020-01-23 PROCEDURE — 81025 URINE PREGNANCY TEST: CPT | Performed by: ANESTHESIOLOGY

## 2020-01-23 PROCEDURE — 37000008 ZZH ANESTHESIA TECHNICAL FEE, 1ST 30 MIN: Performed by: OPHTHALMOLOGY

## 2020-01-23 PROCEDURE — 25000125 ZZHC RX 250: Performed by: NURSE ANESTHETIST, CERTIFIED REGISTERED

## 2020-01-23 PROCEDURE — 82962 GLUCOSE BLOOD TEST: CPT

## 2020-01-23 PROCEDURE — 71000027 ZZH RECOVERY PHASE 2 EACH 15 MINS: Performed by: OPHTHALMOLOGY

## 2020-01-23 PROCEDURE — 25000132 ZZH RX MED GY IP 250 OP 250 PS 637: Performed by: ANESTHESIOLOGY

## 2020-01-23 PROCEDURE — 92060 SENSORIMOTOR EXAMINATION: CPT | Mod: ZF

## 2020-01-23 PROCEDURE — 25000566 ZZH SEVOFLURANE, EA 15 MIN: Performed by: OPHTHALMOLOGY

## 2020-01-23 PROCEDURE — 40000170 ZZH STATISTIC PRE-PROCEDURE ASSESSMENT II: Performed by: OPHTHALMOLOGY

## 2020-01-23 PROCEDURE — 25000128 H RX IP 250 OP 636: Performed by: OPHTHALMOLOGY

## 2020-01-23 PROCEDURE — 37000009 ZZH ANESTHESIA TECHNICAL FEE, EACH ADDTL 15 MIN: Performed by: OPHTHALMOLOGY

## 2020-01-23 RX ORDER — BETAMETHASONE SODIUM PHOSPHATE AND BETAMETHASONE ACETATE 3; 3 MG/ML; MG/ML
INJECTION, SUSPENSION INTRA-ARTICULAR; INTRALESIONAL; INTRAMUSCULAR; SOFT TISSUE PRN
Status: DISCONTINUED | OUTPATIENT
Start: 2020-01-23 | End: 2020-01-23 | Stop reason: HOSPADM

## 2020-01-23 RX ORDER — FENTANYL CITRATE 50 UG/ML
25-50 INJECTION, SOLUTION INTRAMUSCULAR; INTRAVENOUS EVERY 5 MIN PRN
Status: DISCONTINUED | OUTPATIENT
Start: 2020-01-23 | End: 2020-01-23 | Stop reason: HOSPADM

## 2020-01-23 RX ORDER — ONDANSETRON 2 MG/ML
4 INJECTION INTRAMUSCULAR; INTRAVENOUS EVERY 30 MIN PRN
Status: DISCONTINUED | OUTPATIENT
Start: 2020-01-23 | End: 2020-01-23 | Stop reason: HOSPADM

## 2020-01-23 RX ORDER — PROPOFOL 10 MG/ML
INJECTION, EMULSION INTRAVENOUS PRN
Status: DISCONTINUED | OUTPATIENT
Start: 2020-01-23 | End: 2020-01-23

## 2020-01-23 RX ORDER — SODIUM CHLORIDE, SODIUM LACTATE, POTASSIUM CHLORIDE, CALCIUM CHLORIDE 600; 310; 30; 20 MG/100ML; MG/100ML; MG/100ML; MG/100ML
INJECTION, SOLUTION INTRAVENOUS CONTINUOUS PRN
Status: DISCONTINUED | OUTPATIENT
Start: 2020-01-23 | End: 2020-01-23

## 2020-01-23 RX ORDER — NALOXONE HYDROCHLORIDE 0.4 MG/ML
.1-.4 INJECTION, SOLUTION INTRAMUSCULAR; INTRAVENOUS; SUBCUTANEOUS
Status: DISCONTINUED | OUTPATIENT
Start: 2020-01-23 | End: 2020-01-23 | Stop reason: HOSPADM

## 2020-01-23 RX ORDER — MEPERIDINE HYDROCHLORIDE 25 MG/ML
12.5 INJECTION INTRAMUSCULAR; INTRAVENOUS; SUBCUTANEOUS
Status: DISCONTINUED | OUTPATIENT
Start: 2020-01-23 | End: 2020-01-23 | Stop reason: HOSPADM

## 2020-01-23 RX ORDER — DEXAMETHASONE SODIUM PHOSPHATE 4 MG/ML
INJECTION, SOLUTION INTRA-ARTICULAR; INTRALESIONAL; INTRAMUSCULAR; INTRAVENOUS; SOFT TISSUE PRN
Status: DISCONTINUED | OUTPATIENT
Start: 2020-01-23 | End: 2020-01-23

## 2020-01-23 RX ORDER — OXYMETAZOLINE HYDROCHLORIDE 0.05 G/100ML
SPRAY NASAL PRN
Status: DISCONTINUED | OUTPATIENT
Start: 2020-01-23 | End: 2020-01-23 | Stop reason: HOSPADM

## 2020-01-23 RX ORDER — PHENYLEPHRINE HCL IN 0.9% NACL 1 MG/10 ML
SYRINGE (ML) INTRAVENOUS PRN
Status: DISCONTINUED | OUTPATIENT
Start: 2020-01-23 | End: 2020-01-23

## 2020-01-23 RX ORDER — KETOROLAC TROMETHAMINE 30 MG/ML
INJECTION, SOLUTION INTRAMUSCULAR; INTRAVENOUS PRN
Status: DISCONTINUED | OUTPATIENT
Start: 2020-01-23 | End: 2020-01-23

## 2020-01-23 RX ORDER — ONDANSETRON 2 MG/ML
INJECTION INTRAMUSCULAR; INTRAVENOUS PRN
Status: DISCONTINUED | OUTPATIENT
Start: 2020-01-23 | End: 2020-01-23

## 2020-01-23 RX ORDER — ONDANSETRON 4 MG/1
4 TABLET, ORALLY DISINTEGRATING ORAL EVERY 30 MIN PRN
Status: DISCONTINUED | OUTPATIENT
Start: 2020-01-23 | End: 2020-01-23 | Stop reason: HOSPADM

## 2020-01-23 RX ORDER — ACETAMINOPHEN 325 MG/1
325 TABLET ORAL EVERY 4 HOURS PRN
Status: DISCONTINUED | OUTPATIENT
Start: 2020-01-23 | End: 2020-01-23 | Stop reason: HOSPADM

## 2020-01-23 RX ORDER — BALANCED SALT SOLUTION 6.4; .75; .48; .3; 3.9; 1.7 MG/ML; MG/ML; MG/ML; MG/ML; MG/ML; MG/ML
SOLUTION OPHTHALMIC PRN
Status: DISCONTINUED | OUTPATIENT
Start: 2020-01-23 | End: 2020-01-23 | Stop reason: HOSPADM

## 2020-01-23 RX ORDER — OXYCODONE HYDROCHLORIDE 5 MG/1
5 TABLET ORAL EVERY 6 HOURS PRN
Qty: 6 TABLET | Refills: 0 | Status: SHIPPED | OUTPATIENT
Start: 2020-01-23 | End: 2020-04-22

## 2020-01-23 RX ORDER — NEOMYCIN POLYMYXIN B SULFATES AND DEXAMETHASONE 3.5; 10000; 1 MG/ML; [USP'U]/ML; MG/ML
SUSPENSION/ DROPS OPHTHALMIC
Qty: 5 ML | Refills: 0 | Status: SHIPPED | OUTPATIENT
Start: 2020-01-23 | End: 2020-04-22

## 2020-01-23 RX ORDER — SODIUM CHLORIDE, SODIUM LACTATE, POTASSIUM CHLORIDE, CALCIUM CHLORIDE 600; 310; 30; 20 MG/100ML; MG/100ML; MG/100ML; MG/100ML
INJECTION, SOLUTION INTRAVENOUS CONTINUOUS
Status: DISCONTINUED | OUTPATIENT
Start: 2020-01-23 | End: 2020-01-23 | Stop reason: HOSPADM

## 2020-01-23 RX ORDER — LIDOCAINE HYDROCHLORIDE 20 MG/ML
INJECTION, SOLUTION INFILTRATION; PERINEURAL PRN
Status: DISCONTINUED | OUTPATIENT
Start: 2020-01-23 | End: 2020-01-23

## 2020-01-23 RX ADMIN — ONDANSETRON 4 MG: 2 INJECTION INTRAMUSCULAR; INTRAVENOUS at 15:16

## 2020-01-23 RX ADMIN — Medication 100 MCG: at 14:26

## 2020-01-23 RX ADMIN — PROPOFOL 200 MG: 10 INJECTION, EMULSION INTRAVENOUS at 14:08

## 2020-01-23 RX ADMIN — DEXAMETHASONE SODIUM PHOSPHATE 4 MG: 4 INJECTION, SOLUTION INTRAMUSCULAR; INTRAVENOUS at 14:20

## 2020-01-23 RX ADMIN — ACETAMINOPHEN 650 MG: 325 TABLET, FILM COATED ORAL at 16:50

## 2020-01-23 RX ADMIN — KETOROLAC TROMETHAMINE 25 MG: 30 INJECTION, SOLUTION INTRAMUSCULAR at 15:36

## 2020-01-23 RX ADMIN — SODIUM CHLORIDE, POTASSIUM CHLORIDE, SODIUM LACTATE AND CALCIUM CHLORIDE: 600; 310; 30; 20 INJECTION, SOLUTION INTRAVENOUS at 13:59

## 2020-01-23 RX ADMIN — PROPOFOL 40 MG: 10 INJECTION, EMULSION INTRAVENOUS at 14:19

## 2020-01-23 RX ADMIN — LIDOCAINE HYDROCHLORIDE 40 MG: 20 INJECTION, SOLUTION INFILTRATION; PERINEURAL at 14:08

## 2020-01-23 ASSESSMENT — VISUAL ACUITY
OS_CC: 20/20
OD_CC: 20/20
CORRECTION_TYPE: GLASSES
METHOD: SNELLEN - LINEAR IN TF

## 2020-01-23 ASSESSMENT — ENCOUNTER SYMPTOMS
APNEA: 0
ROS SKIN COMMENTS: ACNE

## 2020-01-23 ASSESSMENT — CONF VISUAL FIELD
OS_NORMAL: 1
METHOD: COUNTING FINGERS
OD_NORMAL: 1

## 2020-01-23 ASSESSMENT — MIFFLIN-ST. JEOR: SCORE: 1219.75

## 2020-01-23 NOTE — BRIEF OP NOTE
[facility] Brief Operative Note    Pre-operative diagnosis: Alternating exotropia [H50.15]  Hypertropia of right eye [H50.21]   Post-operative diagnosis same   Procedure: Procedure(s):  Right strabismus surgery   Surgeon(s): Surgeon(s) and Role:     * Roxanna Diez MD - Primary     * Rajeev Ayala MD - Resident - Assisting   Estimated blood loss: 2 cc   Specimens: none   Findings: As expected. See full operative report.

## 2020-01-23 NOTE — OP NOTE
OPHTHALMOLOGY OPERATIVE REPORT    PATIENT:  Marcy Diaz   YOB: 1985   MEDICAL RECORD NUMBER:  [id number]     DATE OF SURGERY:  1/23/2020   LOCATION: [facility]   ANESTHESIA TYPE:  General    SURGEON:  Roxanna Diez MD    ASSISTANTS:  Rajeev Ayala MD     PREOPERATIVE DIAGNOSES:    1. Consecutive exotropia  2. Right hypertropia   2. Status-post prior eye muscle surgery    Status-post bilateral medial rectus recession and bilateral inferior oblique recession (1985) and bilateral lateral rectus recession 5 millimeters with one-half tendon width supraplacement (1997)     POSTOPERATIVE DIAGNOSES:    Same as preoperative diagnosis     PROCEDURES:    - Exploration of right inferior oblique muscle  - Right medial rectus advancement from 14 millimeters to 10.5 millimeters from the limbus, with one-half tendon width infraplacement  - Right lateral rectus one-half tendon width infraplacement   - Eye muscle surgery on previously operated extraocular muscles    IMPLANTS:   None    SPECIMENS:  None     COMPLICATIONS:  None    ESTIMATED BLOOD LOSS:  less than 5 mL      IV FLUIDS:  Per Anesthesia    DISPOSITION:  Marcy was stable for transfer to the postoperative recovery unit upon completion of the procedures.    DETAILS OF THE PROCEDURE:       On the day of surgery, I, Roxanna Diez MD, met the patient, Marcy Diaz, in the preoperative holding area with her family.  I identified the patient and operative sites and marked them on the preoperative marking sheet. Marcy and her  confirmed that she was greatly bothered by her right exotropia that appeared worse to them in left gaze and they also notice the right hypertropia mostly in left gaze. The indications, risks, benefits, and alternatives for the planned procedure were again discussed with the patient and family.  I answered their questions, and they agreed to proceed.  The patient was then transported to the  operating room where she was placed under general anesthesia by the anesthesiologist.  The bed was turned 90 degrees.  The patient was prepped and draped in the usual sterile fashion.  I participated in a preoperative briefing and time-out and personally identified the patient, surgical plan, and operative site(s).     A speculum was placed before the right eye and forced ductions were performed and showed no restriction. The speculum was removed and then placed in the left eye where forced ductions were performed and showed no restrictions. There was thinned sclera visible as grey areas over the original insertion sites for the bilateral medial rectus and lateral rectus muscles. All instruments were removed from the eyes.     Attention was directed to the right eye where a lid speculum was placed. The limbal conjunctiva and episclera were grasped with Nunez locking forceps in the inferotemporal quadrant and the globe was rotated superonasally. A cul-de-sac incision in the conjunctiva was made five millimeters posterior to limbus with Jamel scissors. The dissection was carried through Tenon's capsule down to bare sclera. Good visualization of the inferotemporal quadrant did not show the inferior oblique muscle. The limbal conjunctiva and episclera were then grasped with Nunez locking forceps in the inferonasal quadrant and the globe was rotated superotemporally.  A cul-de-sac incision in the conjunctiva was made five millimeters posterior to limbus with Jamel scissors.  The dissection was carried through Tenon's capsule and episclera down to bare sclera.  A small muscle hook was then used to isolate the inferior rectus muscle followed by a large muscle hook. Exploration of the lateral edge of the inferior rectus shows a thin strand of muscle tissue, presumably of the previously recessed inferior oblique. There was not any connection to proximal muscle tissue and exploration of the tenons showed prominent fat  without any visible muscle. Opening the tenons in this inferotemporal and inferior quadrant was therefore deferred.      Attention was directed to the right medial rectus. A small muscle hook was then used to isolate the medial rectus muscle in its recessed position, followed by a large muscle hook.  Using the small hook, the conjunctiva and Tenon's capsule were then retracted around the tip of the large muscle hook to cleanly reveal its tip. Pole testing confirmed that the entire muscle had been isolated. A cotton-tipped applicator, small hook, and Jamel scissors were used to further dissect through Tenon's capsule anterior to the muscle insertion to expose it cleanly. The muscle was well attached to the sclera with healthy muscle without any pseudotendon or stretched scar. It was located 14 millimeters from the limbus. A double-armed 6-0 Vicryl suture was then imbricated into the muscle just posterior to its insertion and a locking bite was placed in both the superior and inferior one-fourth of the muscle.  The muscle was then cut from its insertion with Jamel scissors.  Castroviejo calipers were used to measure and arvin 3 millimeters anterior to the muscle's prior insertion and one-half tendon inferiorly.  Each arm of the 6-0 Vicryl suture attached to the muscle was then sutured to this new position using partial-thickness scleral passes in a crossed-swords fashion.  The tip of each needle was visualized throughout its pass through the sclera to ensure appropriate depth.   One drop of Betadine 5% ophthalmic solution was instilled into the surgical wound.  The muscle was then pulled up firmly against the globe. It was measured to be 9.5 millimeters from the limbus by curved ruler and was therefore hung back 1 millimeters so that it was 10.5 millimeters from the limbus. The muscle was tied securely in place in a 3-1-1 fashion.  The sutures were then cut leaving a 2 mm tail beyond the knot and the needles and  excess suture were removed from the field.      Attention was directed to then directed to the right lateral rectus. The previously created inferotemporal fronix incision in the conjunctiva was used to introduce a small muscle hook to isolate the lateral rectus muscle followed by a large muscle hook.  Using a two muscle hook technique, the lateral rectus muscle was finally isolated on a large muscle hook.  Using the small hook, the conjunctiva and Tenon's capsule were then retracted around the tip of the large muscle hook to cleanly reveal the tip of the large hook.  Pole testing confirmed that the entire muscle had been isolated. A cotton-tipped applicator, small hook, and Jamel scissors were used to further dissect through Tenon's capsule anterior to the muscle insertion to expose it cleanly. The muscle was healthy without any stretched scar or pseudotendon. It was found to be 13 millimeters from the limbus as measured by curved ruler. A double-armed 6-0 Vicryl suture was then imbricated into the muscle just posterior to its insertion and a locking bite was placed in both the superior and inferior one-fourth of the muscle.  The muscle was then cut from its insertion with Jamel scissors.  Castroviejo calipers were used to measure and arvin a position 13 millimeters posterior to the limbus and one-half tendon-width inferior to the original insertion.  Each arm of the 6-0 Vicryl suture attached to the muscle was then sutured to this new position using partial-thickness scleral passes in a crossed-swords fashion.  The tip of each needle was visualized throughout its pass through the sclera to ensure appropriate depth.   One drop of Betadine 5% ophthalmic solution was instilled into the surgical wound.  The muscle was then pulled up firmly against the globe and accurate placement was verified with a curved ruler.  The suture was then tied securely in place in a 3-1-1 fashion.  The sutures were then cut leaving a 2  mm tail beyond the knot and the needles and excess suture were removed from the field.      The conjunctival incisions were then closed with 8-0 vicryl suture in an interrupted fashion and tied down in a 2-1 fashion.  The sutures were then cut leaving a 1 mm tail beyond the knot and the needles and excess suture were removed from the field. 0.5 milliliters of celestone was injected into the nasal and temporal tenons space. Another drop of Betadine ophthalmic solution was placed on the conjunctival wound.  The lid speculum was removed from the eye. The drapes were removed, the periocular skin was cleaned with sterile saline, and lidocaine ophthalmic ointment was instilled in both eyes. The head of the bed was turned back to the anesthesiologist for reversal of anesthesia.  There were no complications.  Dr. Diez was present for the entire procedure.    Roxanna Diez MD    Pediatric Ophthalmology & Strabismus  Department of Ophthalmology & Visual Neurosciences  [facility]

## 2020-01-23 NOTE — DISCHARGE INSTRUCTIONS
Instructions for after your eye surgery:  Instill one drop of Maxitrol (neomycin/polymyxin/dexamethasone) in right eye 3-4 times daily for 7 days.      Instill preservative-free artificial tears as needed up to every hour for irritation. Must be preservative-free    Apply ice packs to eyes on and off as tolerated for 2 days.    Acetaminophen (Tylenol) and NSAIDs (Motrin, Ibuprofen, Advil, Naproxen) may be given per the dosing instructions on the label for pain every 6 hours.  I recommend alternating these two types of medicine every 3 hours so that Marcy receives one of them for pain control every 3 hours.  (For example: acetaminophen - wait 3 hours - ibuprofen - wait 3 hours - acetaminophen - wait 3 hours - ibuprofen - etc.)    Avoid all eye pressure or trauma. No eye rubbing, straining, or athletics for 1 week.     No water in the face (including bathing) for 1 week. Instill your antibiotic eye drops after bathing for the first week. No swimming for 2 weeks.      Return for follow-up with Dr. Diez as scheduled.  If you do not have an appointment already, please call to arrange follow-up in 1-2 weeks.    [place]: Jamia Ndiaye at [phone] or our  at [phone]    [place]: [phone]    If Marcy Diaz experiences worsening RSVP (Redness, Sensitivity to light, Vision, Pain), or if Marcy develops a fever (temperature greater than 100.4 F) or worsening discharge or if you have any other concerns:      call Dr. Diez's cell phone: [phone]  OR    call [phone] (during business hours) or [phone] (after hours & weekends) and ask to speak with the Ophthalmology Resident or Fellow On-Call   OR    return to the eye clinic or emergency room immediately.     If Marcy is unable to tolerate food and drink, vomits 3 times, or appears to have decreased alertness or lethargy, return to the emergency room immediately as these can be signs of delayed stomach wake-up after  anesthesia and Marcy may need IV fluids to prevent dehydration.    For assistance from an :    7 AM - 6 PM on Monday - Friday, and 7 AM - 4:30 PM on Saturday & Sunday: call [phone], then select option 3.    After hours: call [phone] and ask the  for  assistance.       Same-Day Surgery   Adult Discharge Orders & Instructions     For 24 hours after surgery:  1. Get plenty of rest.  A responsible adult must stay with you for at least 24 hours after you leave the hospital.   2. Pain medication can slow your reflexes. Do not drive or use heavy equipment.  If you have weakness or tingling, don't drive or use heavy equipment until this feeling goes away.  3. Mixing alcohol and pain medication can cause dizziness and slow your breathing. It can even be fatal. Do not drink alcohol while taking pain medication.  4. Avoid strenuous or risky activities.  Ask for help when climbing stairs.   5. You may feel lightheaded.  If so, sit for a few minutes before standing.  Have someone help you get up.   6. If you have nausea (feel sick to your stomach), drink only clear liquids such as apple juice, ginger ale, broth or 7-Up.  Rest may also help.  Be sure to drink enough fluids.  Move to a regular diet as you feel able. Take pain medications with a small amount of solid food, such as toast or crackers, to avoid nausea.   7. A slight fever is normal. Call the doctor if your fever is over 100 F (37.7 C) (taken under the tongue) or lasts longer than 24 hours.  8. You may have a dry mouth, muscle aches, trouble sleeping or a sore throat.  These symptoms should go away after 24 hours.  9. Do not make important or legal decisions.   Pain Management:      1. Take pain medication (if prescribed) for pain as directed by your physician.        2. WARNING: If the pain medication you have been prescribed contains Tylenol  (acetaminophen), DO NOT take additional doses of Tylenol (acetaminophen).     Call  your doctor for any of the followin.  Signs of infection (fever, growing tenderness at the surgery site, severe pain, a large amount of drainage or bleeding, foul-smelling drainage, redness, swelling).    2.  It has been over 8 to 10 hours since surgery and you are still not able to urinate (pee).    3.  Headache for over 24 hours.    4.  Numbness, tingling or weakness the day after surgery (if you had spinal anesthesia).  To contact a doctor, call Dr. Diez or:      [phone] and ask for the Resident On Call for:          Opthalmology (answered 24 hours a day)      Emergency Department:  [place] [facility]: [phone]  [place] [facility]: [phone]

## 2020-01-23 NOTE — ANESTHESIA PREPROCEDURE EVALUATION
Anesthesia Pre-Procedure Evaluation    Patient: Marcy Diaz   MRN:     [id number] Gender:   female   Age:    34 year old :      1985        Preoperative Diagnosis: Alternating exotropia [H50.15]  Hypertropia of right eye [H50.21]   Procedure(s):  Bilateral strabismus surgery and any indicated procedure(s)     Past Medical History:   Diagnosis Date     Acne      ASCUS favor benign     neg HPV; resolved     Depression with anxiety      Exotropia       Past Surgical History:   Procedure Laterality Date     EXTRACTION(S) DENTAL       EYE SURGERY   &     strabismus correction          Anesthesia Evaluation    ROS/Med Hx    No history of anesthetic complications  (-) malignant hyperthermia and tuberculosis  Comments: Met with Marcy and her . Marcy has been NPO and is scheduled for eye muscle surgery;   She also has a history of acne, depression with anxiety. She has done well with prior anesthesia cares for eye procedures except that she wakes up with excitement.     Cardiovascular Findings - negative ROS    Neuro Findings - negative ROS    Pulmonary Findings   (-) asthma and apnea    HENT Findings   Comments: acne    Skin Findings   Comments: acne      GI/Hepatic/Renal Findings   (-) GERD    Endocrine/Metabolic Findings - negative ROS      Genetic/Syndrome Findings - negative genetics/syndromes ROS    Hematology/Oncology Findings - negative hematology/oncology ROS    Additional Notes  Allergies:  No Known Allergies    No current facility-administered medications for this encounter.   Current Outpatient Medications:  drospirenone-ethinyl estradiol (LAUREN) 3-0.02 MG tablet  spironolactone (ALDACTONE) 50 MG tablet            PHYSICAL EXAM:   Mental Status/Neuro: A/A/O   Airway: Facies: Feasible  Mallampati: II  Mouth/Opening: Full  TM distance: > 6 cm  Neck ROM: Full   Respiratory: Auscultation: CTAB     Resp. Rate: Normal     Resp. Effort: Normal      CV: Rhythm: Regular  Rate: Age  "appropriate  Heart: Normal Sounds  Edema: None   Comments:      Dental: Details                    LABS:  CBC:   Lab Results   Component Value Date    WBC 8.5 08/28/2017    WBC 8.7 04/13/2015    HGB 11.2 (L) 12/22/2017    HGB 13.0 08/28/2017    HCT 37.3 08/28/2017    HCT 38.5 04/13/2015     08/28/2017     04/13/2015     BMP:   Lab Results   Component Value Date     01/06/2020    POTASSIUM 4.1 01/06/2020    CHLORIDE 105 01/06/2020    CO2 28 01/06/2020    BUN 12 01/06/2020    CR 0.66 01/06/2020    GLC 87 01/06/2020     COAGS: No results found for: PTT, INR, FIBR  POC:   Lab Results   Component Value Date    BGM 71 01/05/2018     OTHER:   Lab Results   Component Value Date    MARQUISE 8.4 (L) 01/06/2020        Preop Vitals    BP Readings from Last 3 Encounters:   01/06/20 97/68   04/23/18 113/75   03/05/18 104/71    Pulse Readings from Last 3 Encounters:   01/06/20 87   04/23/18 63   03/05/18 82      Resp Readings from Last 3 Encounters:   12/11/15 16   10/30/15 16   10/22/15 16    SpO2 Readings from Last 3 Encounters:   01/06/20 100%   12/22/17 100%   09/25/17 100%      Temp Readings from Last 1 Encounters:   01/06/20 36.8  C (98.3  F) (Oral)    Ht Readings from Last 1 Encounters:   01/06/20 1.676 m (5' 6\")      Wt Readings from Last 1 Encounters:   01/06/20 52.6 kg (116 lb)    Estimated body mass index is 18.72 kg/m  as calculated from the following:    Height as of 1/6/20: 1.676 m (5' 6\").    Weight as of 1/6/20: 52.6 kg (116 lb).     LDA:        Assessment:   ASA SCORE: 1    H&P: History and physical reviewed and following examination; no interval change.   Smoking Status:  Non-Smoker/Unknown   NPO Status: NPO Appropriate     Plan:   Anes. Type:  General   Pre-Medication: None   Induction:  IV (Standard)   Airway: LMA   Access/Monitoring: PIV   Maintenance: Balanced     Postop Plan:   Postop Pain: Opioids  Postop Sedation/Airway: Not planned  Disposition: Outpatient     PONV Management:   Adult Risk " Factors: Female, Non-Smoker, Postop Opioids   Prevention: Ondansetron, Dexamethasone     CONSENT: Direct conversation   Plan and risks discussed with: Patient; Other   Blood Products: Consent Deferred (Minimal Blood Loss)       Comments for Plan/Consent:  She requests GA. Procedures and risks explained. She understood and consented. Qs answered.          Ezra Boggs MD

## 2020-01-23 NOTE — ANESTHESIA CARE TRANSFER NOTE
Patient: Marcy Diaz    Procedure(s):  Right strabismus surgery    Diagnosis: Alternating exotropia [H50.15]  Hypertropia of right eye [H50.21]  Diagnosis Additional Information: No value filed.    Anesthesia Type:   General     Note:  Airway :Face Mask  Patient transferred to:PACU  Comments: Patient transferred to PACU on simple facemask O2 at 8 LPM. VSS and respiration WNL upon arrival. Report to RN, all questions answered. ALISON Russell CRNA on 1/23/2020 at 3:48 PM   Handoff Report: Identifed the Patient, Identified the Reponsible Provider, Reviewed the pertinent medical history, Discussed the surgical course, Reviewed Intra-OP anesthesia mangement and issues during anesthesia, Set expectations for post-procedure period and Allowed opportunity for questions and acknowledgement of understanding      Vitals: (Last set prior to Anesthesia Care Transfer)    CRNA VITALS  1/23/2020 1512 - 1/23/2020 1548      1/23/2020             NIBP:  107/70    Pulse:  92    Temp:  36.4  C (97.5  F)    SpO2:  100 %    Resp Rate (observed):  12                Electronically Signed By: ALISON Russell CRNA  January 23, 2020  3:48 PM

## 2020-01-23 NOTE — ANESTHESIA POSTPROCEDURE EVALUATION
Anesthesia POST Procedure Evaluation    Patient: Marcy Diaz   MRN:     [id number] Gender:   female   Age:    34 year old :      1985        Preoperative Diagnosis: Alternating exotropia [H50.15]  Hypertropia of right eye [H50.21]   Procedure(s):  Right strabismus surgery   Postop Comments: No value filed.       Anesthesia Type:  Not documented  General    Reportable Event: NO     PAIN: Uncomplicated   Sign Out status: Comfortable, Well controlled pain     PONV: No PONV   Sign Out status:  No Nausea or Vomiting     Neuro/Psych: Uneventful perioperative course   Sign Out Status: Preoperative baseline; Age appropriate mentation     Airway/Resp.: Uneventful perioperative course   Sign Out Status: Non labored breathing, age appropriate RR; Resp. Status within EXPECTED Parameters     CV: Uneventful perioperative course   Sign Out status: Appropriate BP and perfusion indices; Appropriate HR/Rhythm     Disposition:   Sign Out in:  PACU  Disposition:  Phase II; Home  Recovery Course: Uneventful  Follow-Up: Not required           Last Anesthesia Record Vitals:  CRNA VITALS  2020 1512 - 2020 1612      2020             NIBP:  107/70    Pulse:  92    Temp:  36.4  C (97.5  F)    SpO2:  100 %    Resp Rate (observed):  12          Last PACU Vitals:  Vitals Value Taken Time   /73 2020  4:15 PM   Temp 36.9  C (98.4  F) 2020  4:15 PM   Pulse 82 2020  4:15 PM   Resp 15 2020  4:16 PM   SpO2 100 % 2020  4:18 PM   Temp src     NIBP 107/70 2020  3:47 PM   Pulse 92 2020  3:47 PM   SpO2 100 % 2020  3:47 PM   Resp     Temp 36.4  C (97.5  F) 2020  3:47 PM   Ht Rate     Temp 2     Vitals shown include unvalidated device data.      Electronically Signed By: Lester Masters DO, 2020, 5:50 PM

## 2020-01-23 NOTE — NURSING NOTE
Chief Complaint(s) and History of Present Illness(es)     Hypertropia Follow Up     Laterality: right eye    Onset: present since childhood    Quality: vertical    Comments: Here for measurements after Aric occlusion. Put patch on around 10 am. Alignment of eyes has remained stable.

## 2020-01-23 NOTE — PROGRESS NOTES
Chief Complaint(s) & History of Present Illness  Chief Complaint(s) and History of Present Illness(es)     Hypertropia Follow Up     Laterality: right eye    Onset: present since childhood    Quality: vertical    Comments: Here for measurements after Aric occlusion. Put patch on around 10 am. Alignment of eyes has remained stable.                  Assessment and Plan:      Marcy Diaz is a 34 year old female who presents with:     Alternating exotropia  Angle stable compared to LV. No increase in XT after Aric occlusion.  - Sensorimotor  - External Photos 9 [employer] Gazes    Hypertropia of right eye  Stable compared to LV.  - Sensorimotor  - External Photos 9 [employer] Gazes    Ocular torticollis  Stable  - Sensorimotor  - External Photos 9 [employer] Gazes    Latent nystagmus    - Sensorimotor  - External Photos 9 [employer] Gazes       PLAN:  Proceed to OR for surgery with Dr. Diez.  Attending Physician Attestation:  I did not see Marcy Diaz at this encounter, but I was available and reviewed the history, examination, assessment, and plan as documented. I agree with the plan. - Roxanna Diez MD

## 2020-01-24 ENCOUNTER — TELEPHONE (OUTPATIENT)
Dept: OPHTHALMOLOGY | Facility: CLINIC | Age: 35
End: 2020-01-24

## 2020-01-24 NOTE — TELEPHONE ENCOUNTER
Postop check-in; left voicemail for Marcy to call back with any questions or concerns. Also called her  - cell did not ring and voicemail was full.

## 2020-01-25 NOTE — TELEPHONE ENCOUNTER
1/24/20 connected with Marcy -- she has just started to open the right eye. Doing well without much pain. No double vision. Vision is good. Red as expected. No concerns or questions. Reviewed RSVP and she will call with any concerns whatsoever.

## 2020-01-31 ENCOUNTER — OFFICE VISIT (OUTPATIENT)
Dept: OPHTHALMOLOGY | Facility: CLINIC | Age: 35
End: 2020-01-31
Attending: OPHTHALMOLOGY
Payer: COMMERCIAL

## 2020-01-31 DIAGNOSIS — H50.21 HYPOTROPIA OF RIGHT EYE: ICD-10-CM

## 2020-01-31 DIAGNOSIS — Z98.890 POSTOPERATIVE EYE STATE: Primary | ICD-10-CM

## 2020-01-31 DIAGNOSIS — H50.05 ESOTROPIA, ALTERNATING: ICD-10-CM

## 2020-01-31 PROCEDURE — G0463 HOSPITAL OUTPT CLINIC VISIT: HCPCS | Mod: ZF

## 2020-01-31 ASSESSMENT — CONF VISUAL FIELD
OS_NORMAL: 1
OD_NORMAL: 1
METHOD: TOYS

## 2020-01-31 ASSESSMENT — VISUAL ACUITY
OD_CC: 20/20
OS_CC+: -1
METHOD: SNELLEN - LINEAR
OS_CC: 20/20
CORRECTION_TYPE: GLASSES

## 2020-01-31 ASSESSMENT — REFRACTION_WEARINGRX
OD_AXIS: 085
OS_CYLINDER: +1.00
OS_AXIS: 095
SPECS_TYPE: SVL
OD_CYLINDER: +1.00
OD_SPHERE: -1.25
OS_SPHERE: -2.25

## 2020-01-31 ASSESSMENT — SLIT LAMP EXAM - LIDS
COMMENTS: NORMAL
COMMENTS: NORMAL

## 2020-01-31 ASSESSMENT — EXTERNAL EXAM - RIGHT EYE: OD_EXAM: NORMAL

## 2020-01-31 ASSESSMENT — EXTERNAL EXAM - LEFT EYE: OS_EXAM: NORMAL

## 2020-01-31 NOTE — PROGRESS NOTES
Chief Complaint(s) and History of Present Illness(es)     Post Op (Ophthalmology) Both Eyes     In right eye.  Onset was gradual.  Severity is mild.  Occurring infrequently.  Since onset it is gradually improving.  Associated symptoms include Negative for double vision, eye pain and nausea.  Treatments tried include eye drops and glasses.  Response to treatment was moderate improvement.  Pain was noted as 0/10.              Comments     S/p RMR advancement from 14 millimeters to 10.5 millimeters from the limbus with one-half tendon width infraplacement & RLR one-half tendon width infraplacement (1/23/2020).   Reported double vision for the first time yesterday while working on her computer very late at night. Words were stacked on top of each other. Went away after resting eyes. No changes in VA. No irritation or pain. Some redness ou. Stopped maxitrol yesterday.             Review of systems for the eyes was negative other than the pertinent positives and negatives noted in the HPI. History is obtained from the patient.    Primary care: Neelam Ahmadi   Referring provider: Referred Self  DELILAH AGUILAR is home  Assessment & Plan   Marcy Diaz is a 34 year old female who presents with:     Postoperative eye state  Esotropia, alternating  Hypotropia of right eye       history of BMR+BIOc(1985); BLR5+one-half tendon width supraplacement (1997)   s/p RMadvancement to 10.5mm +1/2 TW infraplacement; RLR 1/2 TW infraplacement and exploration of NIRAV 1/23/20  Excellent vision and eye alignment.   The surgical sites are healing well and Marcy is recovering nicely.   - Instructions given.  RSVP.  Family has my cell phone number for any concerns whatsoever.       Return in about 3 months (around 4/30/2020) for Vision & alignment.    Patient Instructions   Instructions for after your eye surgery:  You may return to regular activities 1 week after surgery. However, no swimming or sand or dirt in the eyes for 2 weeks  after surgery.     Call Dr. Diez's cell phone: [phone] anytime for worsening eye redness, sensitivity to light, vision, pain, or any other concerns whatsoever.     For assistance from an :    7 AM - 6 PM on Monday - Friday, and 7 AM - 4:30 PM on Saturday & Sunday: call [phone], then select option 3.    After hours: call [phone] and ask the  for  assistance.        Visit Diagnoses & Orders    ICD-10-CM    1. Postoperative eye state Z98.890    2. Esotropia, alternating H50.05    3. Hypotropia of right eye H50.21       Attending Physician Attestation:  Complete documentation of historical and exam elements from today's encounter can be found in the full encounter summary report (not reduplicated in this progress note).  I personally obtained the chief complaint(s) and history of present illness.  I confirmed and edited as necessary the review of systems, past medical/surgical history, family history, social history, and examination findings as documented by others; and I examined the patient myself.  I personally reviewed the relevant tests, images, and reports as documented above.  I formulated and edited as necessary the assessment and plan and discussed the findings and management plan with the patient and family. - Roxanna Diez MD

## 2020-01-31 NOTE — NURSING NOTE
Chief Complaint(s) and History of Present Illness(es)     Post Op (Ophthalmology) Both Eyes     Laterality: right eye    Onset: gradual    Severity: mild    Frequency: infrequently    Course: gradually improving    Associated symptoms: Negative for double vision, eye pain and nausea    Treatments tried: eye drops and glasses    Response to treatment: moderate improvement    Pain scale: 0/10              Comments     S/p RMR advancement from 14 millimeters to 10.5 millimeters from the limbus with one-half tendon width infraplacement & RLR one-half tendon width infraplacement (1/23/2020).   Reported double vision for the first time yesterday while working on her computer very late at night. Words were stacked on top of each other. Went away after resting eyes. No changes in VA. No irritation or pain. Some redness ou. Stopped maxitrol yesterday.

## 2020-01-31 NOTE — PATIENT INSTRUCTIONS
Instructions for after your eye surgery:  You may return to regular activities 1 week after surgery. However, no swimming or sand or dirt in the eyes for 2 weeks after surgery.     Call Dr. Diez's cell phone: [phone] anytime for worsening eye redness, sensitivity to light, vision, pain, or any other concerns whatsoever.     For assistance from an :    7 AM - 6 PM on Monday - Friday, and 7 AM - 4:30 PM on Saturday & Sunday: call [phone], then select option 3.    After hours: call [phone] and ask the  for  assistance.

## 2020-01-31 NOTE — LETTER
1/31/2020    To: Neelam Ahmadi, APRN CNP  [address]  [facility] [address]    Re:  Marcy Diaz    YOB: 1985    MRN: [id number]    Dear Colleague,     It was my pleasure to see Marcy on 1/31/2020.  In summary, Marcy Diaz is a 34 year old female who presents with:     Postoperative eye state  Esotropia, alternating  Hypotropia of right eye        history of BMR+BIOc(1985); BLR5+one-half tendon width supraplacement (1997)    s/p RMadvancement to 10.5mm +1/2 TW infraplacement; RLR 1/2 TW infraplacement and exploration of NIRAV 1/23/20  Excellent vision and eye alignment.   The surgical sites are healing well and Marcy is recovering nicely.   - Instructions given.  RSVP.  Family has my cell phone number for any concerns whatsoever.     Thank you for the opportunity to care for Marcy. I have asked her to Return in about 3 months (around 4/30/2020) for Vision & alignment.  Until then, please do not hesitate to contact me or my clinic with any questions or concerns.          Warm regards,          Roxanna Diez MD                 Pediatric Ophthalmology & Strabismus        Department of Ophthalmology & Visual Neurosciences        [facility]   CC:  Marcy Diaz

## 2020-02-12 ENCOUNTER — OFFICE VISIT (OUTPATIENT)
Dept: DERMATOLOGY | Facility: CLINIC | Age: 35
End: 2020-02-12
Payer: COMMERCIAL

## 2020-02-12 DIAGNOSIS — L70.0 ACNE VULGARIS: Primary | ICD-10-CM

## 2020-02-12 PROCEDURE — 99213 OFFICE O/P EST LOW 20 MIN: CPT | Performed by: PHYSICIAN ASSISTANT

## 2020-02-12 RX ORDER — SPIRONOLACTONE 50 MG/1
150 TABLET, FILM COATED ORAL DAILY
Qty: 90 TABLET | Refills: 2 | Status: SHIPPED | OUTPATIENT
Start: 2020-02-12 | End: 2020-05-14

## 2020-02-12 ASSESSMENT — PAIN SCALES - GENERAL: PAINLEVEL: NO PAIN (0)

## 2020-02-12 NOTE — PROGRESS NOTES
[facility] Dermatology Note      Dermatology Problem List:  1. Acne, hormonal  - s/p spironolactone 100 mg daily, BPO 3% wash, differin 0.1% cream, tretinoin 0.025% cream, clindamycin 1%   - current tx: OCP (Ambar), BPO wash, differin 0.1% gel, spironolactone 150 mg daily    Encounter Date: Feb 12, 2020    CC:  Chief Complaint   Patient presents with     Acne     spironolactone, ocp, differin - breaking out with menses         History of Present Illness:  Ms. Marcy Diaz is a 34 year old female who presents as a follow-up for acne. The patient was last seen on 11/6/2019 when her dose of spironolactone was increased to 150 mg po every day, differin 0.1% gel was continued for acne vulgaris. At today's visit, the patient notes she has not noticed an improvement since increasing her dose of spironolactone. However overall her acne is improved. She still gets 2-3 breakouts in associated with the onset of her menstrual cycle. Notes no adverse effects to the spirolactone. The patient complains of skin dryness but notes she does not drink enough water. She states she has been using spironolactone, Differin gel every night and has not been using benzoyl peroxide cleanser recently because of the dryness. She states that she gets approximately 3 pimples on her chin when she gets a breakout. The patient denies additional lesions or areas of concern. Denies menstrual irregularities, breast tenderness, or excessive urination. The patient is on OCP.     Past Medical History:   Patient Active Problem List   Diagnosis     Acne     CARDIOVASCULAR SCREENING; LDL GOAL LESS THAN 160     Alternating exotropia     Hypertropia of right eye     Past Medical History:   Diagnosis Date     Acne      ASCUS favor benign 2013    neg HPV; resolved     Depression with anxiety 2005     Exotropia      Past Surgical History:   Procedure Laterality Date     EXTRACTION(S) DENTAL       EYE SURGERY  1985 & 1998    strabismus correction      RECESSION RESECTION (REPAIR STRABISMUS) Right 1/23/2020    Procedure: Right strabismus surgery (- Exploration of right inferior oblique muscle,  - Right medial rectus advancement from 14 millimeters to 10.5 millimeters from the limbus, with one-half tendon width infraplacement,  - Right lateral rectus one-half tendon width infraplacement,  - Eye muscle surgery on previously operated extraocular muscles);  Surgeon: Roxanna Diez MD;  Location: [facility] OR       Social History:   reports that she has never smoked. She has never used smokeless tobacco. She reports current alcohol use. She reports that she does not use drugs.    Family History:  Family History   Problem Relation Age of Onset     C.A.D. Maternal Grandfather      Diabetes Maternal Grandfather      Hypertension Maternal Grandfather      Prostate Cancer Maternal Grandfather      C.A.D. Paternal Grandfather      Diabetes Paternal Grandfather      Hypertension Paternal Grandfather      Hypertension Mother      Thyroid Disease Mother         hypothyroidism     Breast Cancer Paternal Grandmother      Alcohol/Drug Father         alcohol     Cancer Father         skin     Asthma No family hx of      Cerebrovascular Disease No family hx of      Cancer - colorectal No family hx of      Lipids No family hx of      Depression No family hx of        Medications:  Current Outpatient Medications   Medication Sig Dispense Refill     drospirenone-ethinyl estradiol (LAUREN) 3-0.02 MG tablet Take 1 tablet by mouth daily 28 tablet 11     neomycin-polymixin-dexamethasone (MAXITROL) 0.1 % ophthalmic suspension Instill 1 drop 4 times daily to right eye for 7 days 5 mL 0     spironolactone (ALDACTONE) 50 MG tablet Take 3 tablets (150 mg) by mouth daily 90 tablet 2       No Known Allergies    Review of Systems:  -GI: The patient denies nausea, vomiting, diarrhea or abdominal pain.  -: No changes in menstrual cycle, no breast tenderness, no change in urination  -CVS: no palpitations,  dizziness  -Constitutional: The patient denies fatigue, fevers, chills, unintended weight loss, and night sweats.  -HEENT: Patient denies nonhealing oral sores.  -Skin: As above in HPI. No additional skin concerns.    Physical exam:  Vitals: There were no vitals taken for this visit. Weight: 115lbs  GEN: This is a well developed, well-nourished female in no acute distress, in a pleasant mood.   SKIN: Acne exam, which includes the face, neck, upper central chest, and upper central back was performed.  -Dryness of the upper lip  -No active papules of the lower face  -No other lesions of concern on areas examined.       Impression/Plan:  1. Acne vulgaris, hormonal  - Will continue isotretinoin 150mg po every day for another 3mo - If patient is still having breakouts in 3mo will consider course of isotretinoin. Patient understands that hormonal acne is somewhat less responsive to isotretinoin and she may continue to get breakouts despite isotretinoin use. Prescription of spironolactone refilled today.   - Discussed risks of isotretinoin  - Continue differin 0.1% gel  - Continue cetaphil/cerave moisturizer and cleanser  - Aquaphor and/or Vaseline to lips nightly  - Briefly discussed fraxel laser tx for scarring on the lower face. Will consider once acne is well controlled on spironolactone or s/p isotretinoin.   - Patient will return in 2 months to decide if she will be starting accutane or continuing spironolactone if improvement is observed. Will discuss with . Discussed with patient the need for two forms of contraception - she is on an OCP but would need a back up form, likely condoms. Will recommend continuing with spironolactone if we decide to initiate treatment with isotretinoin    CC Dr. Canseco on close of this encounter.  Follow-up in 2-3 months, earlier for new or changing lesions.       Staff Involved:  Staff/Scribe    Scribe Disclosure:  Wes MOJICA, am serving as a scribe to document services  personally performed by Mihaela John PA-C, based on data collection and the provider's statements to me.     Provider Disclosure:   The documentation recorded by the scribe accurately reflects the services I personally performed and the decisions made by me.    All risks, benefits and alternatives were discussed with patient.  Patient is in agreement and understands the assessment and plan.  All questions were answered.    Mihaela John PA-C, [facility]  [facility] [place]: Phone: [phone], Fax: [phone]  [facility]: Phone: [phone],  Fax: [phone]

## 2020-02-12 NOTE — NURSING NOTE
@Marcy Diaz's goals for this visit include:   Chief Complaint   Patient presents with     Acne     spironolactone, ocp, differin - breaking out with menses       She requests these members of her care team be copied on today's visit information: NO    PCP: Neelam Ahmadi    Referring Provider:  No referring provider defined for this encounter.    There were no vitals taken for this visit.    Do you need any medication refills at today's visit? NO    Sabrina Shabazz CMA

## 2020-02-12 NOTE — LETTER
2/12/2020         RE: Marcy Diaz  [address] [facility] [address]        Dear Colleague,    Thank you for referring your patient, Marcy Diaz, to the [facility]. Please see a copy of my visit note below.    [facility] Dermatology Note      Dermatology Problem List:  1. Acne, hormonal  - s/p spironolactone 100 mg daily, BPO 3% wash, differin 0.1% cream, tretinoin 0.025% cream, clindamycin 1%   - current tx: OCP (Ambar), BPO wash, differin 0.1% gel, spironolactone 150 mg daily    Encounter Date: Feb 12, 2020    CC:  Chief Complaint   Patient presents with     Acne     spironolactone, ocp, differin - breaking out with menses         History of Present Illness:  Ms. Marcy Diaz is a 34 year old female who presents as a follow-up for acne. The patient was last seen on 11/6/2019 when her dose of spironolactone was increased to 150 mg po every day, differin 0.1% gel was continued for acne vulgaris. At today's visit, the patient notes she has not noticed an improvement since increasing her dose of spironolactone. However overall her acne is improved. She still gets 2-3 breakouts in associated with the onset of her menstrual cycle. Notes no adverse effects to the spirolactone. The patient complains of skin dryness but notes she does not drink enough water. She states she has been using spironolactone, Differin gel every night and has not been using benzoyl peroxide cleanser recently because of the dryness. She states that she gets approximately 3 pimples on her chin when she gets a breakout. The patient denies additional lesions or areas of concern. Denies menstrual irregularities, breast tenderness, or excessive urination. The patient is on OCP.     Past Medical History:   Patient Active Problem List   Diagnosis     Acne     CARDIOVASCULAR SCREENING; LDL GOAL LESS THAN 160     Alternating exotropia     Hypertropia of right eye     Past Medical History:   Diagnosis  Date     Acne      ASCUS favor benign 2013    neg HPV; resolved     Depression with anxiety 2005     Exotropia      Past Surgical History:   Procedure Laterality Date     EXTRACTION(S) DENTAL       EYE SURGERY  1985 & 1998    strabismus correction     RECESSION RESECTION (REPAIR STRABISMUS) Right 1/23/2020    Procedure: Right strabismus surgery (- Exploration of right inferior oblique muscle,  - Right medial rectus advancement from 14 millimeters to 10.5 millimeters from the limbus, with one-half tendon width infraplacement,  - Right lateral rectus one-half tendon width infraplacement,  - Eye muscle surgery on previously operated extraocular muscles);  Surgeon: Roxanna Diez MD;  Location: [facility] OR       Social History:   reports that she has never smoked. She has never used smokeless tobacco. She reports current alcohol use. She reports that she does not use drugs.    Family History:  Family History   Problem Relation Age of Onset     C.A.D. Maternal Grandfather      Diabetes Maternal Grandfather      Hypertension Maternal Grandfather      Prostate Cancer Maternal Grandfather      C.A.D. Paternal Grandfather      Diabetes Paternal Grandfather      Hypertension Paternal Grandfather      Hypertension Mother      Thyroid Disease Mother         hypothyroidism     Breast Cancer Paternal Grandmother      Alcohol/Drug Father         alcohol     Cancer Father         skin     Asthma No family hx of      Cerebrovascular Disease No family hx of      Cancer - colorectal No family hx of      Lipids No family hx of      Depression No family hx of        Medications:  Current Outpatient Medications   Medication Sig Dispense Refill     drospirenone-ethinyl estradiol (LAUREN) 3-0.02 MG tablet Take 1 tablet by mouth daily 28 tablet 11     neomycin-polymixin-dexamethasone (MAXITROL) 0.1 % ophthalmic suspension Instill 1 drop 4 times daily to right eye for 7 days 5 mL 0     spironolactone (ALDACTONE) 50 MG tablet Take 3 tablets (150 mg) by  mouth daily 90 tablet 2       No Known Allergies    Review of Systems:  -GI: The patient denies nausea, vomiting, diarrhea or abdominal pain.  -: No changes in menstrual cycle, no breast tenderness, no change in urination  -CVS: no palpitations, dizziness  -Constitutional: The patient denies fatigue, fevers, chills, unintended weight loss, and night sweats.  -HEENT: Patient denies nonhealing oral sores.  -Skin: As above in HPI. No additional skin concerns.    Physical exam:  Vitals: There were no vitals taken for this visit. Weight: 115lbs  GEN: This is a well developed, well-nourished female in no acute distress, in a pleasant mood.   SKIN: Acne exam, which includes the face, neck, upper central chest, and upper central back was performed.  -Dryness of the upper lip  -No active papules of the lower face  -No other lesions of concern on areas examined.       Impression/Plan:  1. Acne vulgaris, hormonal  - Will continue isotretinoin 150mg po every day for another 3mo - If patient is still having breakouts in 3mo will consider course of isotretinoin. Patient understands that hormonal acne is somewhat less responsive to isotretinoin and she may continue to get breakouts despite isotretinoin use. Prescription of spironolactone refilled today.   - Discussed risks of isotretinoin  - Continue differin 0.1% gel  - Continue cetaphil/cerave moisturizer and cleanser  - Aquaphor and/or Vaseline to lips nightly  - Briefly discussed fraxel laser tx for scarring on the lower face. Will consider once acne is well controlled on spironolactone or s/p isotretinoin.   - Patient will return in 2 months to decide if she will be starting accutane or continuing spironolactone if improvement is observed. Will discuss with . Discussed with patient the need for two forms of contraception - she is on an OCP but would need a back up form, likely condoms. Will recommend continuing with spironolactone if we decide to initiate treatment  with isotretinoin    CC Dr. Canseco on close of this encounter.  Follow-up in 2-3 months, earlier for new or changing lesions.       Staff Involved:  Staff/Scribe    Scribe Disclosure:  I, Wes Sinclair, am serving as a scribe to document services personally performed by Mihaela John PA-C, based on data collection and the provider's statements to me.     Provider Disclosure:   The documentation recorded by the scribe accurately reflects the services I personally performed and the decisions made by me.    All risks, benefits and alternatives were discussed with patient.  Patient is in agreement and understands the assessment and plan.  All questions were answered.    Mihaela John PA-C, [facility]  [facility] [place]: Phone: [phone], Fax: [phone]  [facility]: Phone: [phone],  Fax: [phone]                      Again, thank you for allowing me to participate in the care of your patient.        Sincerely,        Mihaela John PA-C

## 2020-04-10 DIAGNOSIS — L70.0 ACNE VULGARIS: ICD-10-CM

## 2020-04-10 NOTE — TELEPHONE ENCOUNTER
Fax received for a refill request of LAUREN. Sending to refill team to review    Goldie Stevens LPN

## 2020-04-14 RX ORDER — DROSPIRENONE AND ETHINYL ESTRADIOL 0.02-3(28)
1 KIT ORAL DAILY
Qty: 28 TABLET | Refills: 2 | Status: SHIPPED | OUTPATIENT
Start: 2020-04-14 | End: 2020-07-27

## 2020-04-14 NOTE — TELEPHONE ENCOUNTER
Received refill request for Ambar as the resident on call. Reviewed patient's chart and attached communication. Patient last seen for acne and the plan was to continue Ambar. The pt was previously informed to have annual exams with ob/gyn.  After reviewing the medication list and assessment and plan from last visit, the refill request was accepted. 3 month Refill request approved and sent to pharmacy. Please call to inform the pt that she should follow-up with her gynecologist who can continue to refill if appropriate.     Brianna Lopez MD - PGY-4  Dermatology Resident on Call  [facility] Dermatology        Brianna Lopez MD - PGY-4  Dermatology Resident on Call  [facility]erm[facility]

## 2020-04-14 NOTE — TELEPHONE ENCOUNTER
[facility] Call Center    Phone Message    May a detailed message be left on voicemail: yes     Reason for Call: Other: Pt calling in checking the status of this request. Pt is needing medication. Please follow up when available. Thank you      Action Taken: Message routed to:  [facility] derm    Travel Screening: Not Applicable

## 2020-04-20 ENCOUNTER — TELEPHONE (OUTPATIENT)
Dept: OPHTHALMOLOGY | Facility: CLINIC | Age: 35
End: 2020-04-20

## 2020-04-20 NOTE — TELEPHONE ENCOUNTER
Left voicemail message for parent to call back regarding information needed prior to patient's upcoming video visit.    Mark Harry, COT

## 2020-04-21 ENCOUNTER — TELEPHONE (OUTPATIENT)
Dept: OPHTHALMOLOGY | Facility: CLINIC | Age: 35
End: 2020-04-21

## 2020-04-21 NOTE — TELEPHONE ENCOUNTER
Left voicemail message for parent to call back regarding information needed prior to patient's upcoming video visit.    *second attempt     Mark Harry COT

## 2020-04-21 NOTE — PROGRESS NOTES
"Marcy Diaz is a 35 year old female who is being evaluated via a billable video visit.      The patient has been notified of following:     \"This video visit will be conducted via a call between you and your physician/provider. We have found that certain health care needs can be provided without the need for an in-person physical exam.  This service lets us provide the care you need with a video conversation.  If a prescription is necessary we can send it directly to your pharmacy.  If lab work is needed we can place an order for that and you can then stop by our lab to have the test done at a later time.    Video visits are billed at different rates depending on your insurance coverage.  Please reach out to your insurance provider with any questions.    If during the course of the call the physician/provider feels a video visit is not appropriate, you will not be charged for this service.\"    Patient has given verbal consent for Video visit? Yes    Patient would like the video invitation sent by: Send to e-mail at: [email or web address]    "

## 2020-04-22 ENCOUNTER — VIRTUAL VISIT (OUTPATIENT)
Dept: OPHTHALMOLOGY | Facility: CLINIC | Age: 35
End: 2020-04-22
Attending: OPHTHALMOLOGY
Payer: COMMERCIAL

## 2020-04-22 DIAGNOSIS — H50.32 INTERMITTENT ESOTROPIA, ALTERNATING: Primary | ICD-10-CM

## 2020-04-22 ASSESSMENT — REFRACTION_WEARINGRX
OS_CYLINDER: +1.00
OD_CYLINDER: +1.00
OD_AXIS: 085
SPECS_TYPE: SVL
OS_AXIS: 095
OD_SPHERE: -1.25
OS_SPHERE: -2.25

## 2020-04-22 ASSESSMENT — EXTERNAL EXAM - LEFT EYE: OS_EXAM: NORMAL

## 2020-04-22 ASSESSMENT — SLIT LAMP EXAM - LIDS
COMMENTS: NORMAL
COMMENTS: NORMAL

## 2020-04-22 ASSESSMENT — VISUAL ACUITY
CORRECTION_TYPE: GLASSES
METHOD: SNELLEN - LINEAR AT HOME
OS_CC: 20/20
OD_CC: 20/20

## 2020-04-22 ASSESSMENT — TONOMETRY: IOP_UNABLETOASSESS: 1

## 2020-04-22 ASSESSMENT — EXTERNAL EXAM - RIGHT EYE: OD_EXAM: NORMAL

## 2020-04-22 NOTE — PROGRESS NOTES
"Chief Complaint(s) and History of Present Illness(es)     Post Op (Ophthalmology) Right Eye     In right eye.Treatments tried include glasses. Additional comments: Healed well since sx, no VA changes, ET improved, no diplopia. Very happy with alignment. Can seee small esotropia in the mirror only.             Review of systems for the eyes was negative other than the pertinent positives and negatives noted in the HPI. History is obtained from the patient.    Today's visit was conducted via synchronous video.    Primary care: Neelam Ahmadi   Referring provider: Referred Self  DELILAH AGUILAR is home  Assessment & Plan   Marcy Diaz is a 35 year old female who presents with:     Intermittent esotropia    History of BMR+BIOc(1985); BLR5+one-half tendon width supraplacement (1997)    s/p RMadvancement to 10.5mm +1/2 TW infraplacement; RLR 1/2 TW infraplacement and exploration of NIRAV 1/23/20    Excellent vision and eye alignment. Has healed beautifully and is ready to return to regular eye care.   - No need for strabismus follow up at this time. I would be happy to see Marcy any time in the future as needed.        Return if symptoms worsen or fail to improve.  Video Visit Details  Prior to the start of the visit, the patient was notified: \"This video visit will be conducted via a call between you and your physician/provider. We have found that certain health care needs can be provided without the need for an in-person physical exam.  This service lets us provide the care you need with a video conversation.  If a prescription is necessary we can send it directly to your pharmacy.  If lab work is needed we can place an order for that and you can then stop by our lab to have the test done at a later time. If during the course of the call the physician/provider feels a video visit is not appropriate, you will not be charged for this service.\"   Patient gave verbal consent for Video visit? Yes  Type of service:  " Video Visit  Mode of Communication:  Video Conference via QR Wild  Video Start Time: 1st VideoStart: 04/22/2020 02:55 pm   Stop: 04/22/2020 03:23 pm  Video Call with Provider Start: 04/22/2020 03:23 pm   Stop: 04/22/2020 03:27 pm  Originating Location (pt. Location): Home  Distant Location (provider location):  Home. Dept: [facility] PEDS EYE GENERAL    Patient Instructions   It has been a pleasure to take care of you and I wish you all the best in the future. If you have any strabismus needs in the future please reach out any time. Continue regular eye care (exams are recommended every 1-2 years) with your local provider. If you need a care provider, I recommend Dr. Enoch Boyd or Dr. Paradise Muñiz.     Dr. Boyd: [email or web address]    Dr. Muñiz: [email or web address]    To schedule an appointment, call [phone].        Visit Diagnoses & Orders    ICD-10-CM    1. Intermittent esotropia, alternating  H50.32       Attending Physician Attestation:  Complete documentation of historical and exam elements from today's encounter can be found in the full encounter summary report (not reduplicated in this progress note).  I personally obtained the chief complaint(s) and history of present illness.  I confirmed and edited as necessary the review of systems, past medical/surgical history, family history, social history, and examination findings as documented by others; and I examined the patient myself.  I personally reviewed the relevant tests, images, and reports as documented above.  I formulated and edited as necessary the assessment and plan and discussed the findings and management plan with the patient and family. - Roxanna Diez MD

## 2020-04-22 NOTE — LETTER
4/22/2020    To: Neelam Ahmadi, APRN CNP  [address]  [facility] [address]    Re:  Marcy Diaz    YOB: 1985    MRN: [id number]    Dear Colleague,     It was my pleasure to see Marcy on 4/22/2020.  In summary, Marcy Diaz is a 35 year old female who presents with:     Intermittent esotropia    History of BMR+BIOc(1985); BLR5+one-half tendon width supraplacement (1997)    s/p RMadvancement to 10.5mm +1/2 TW infraplacement; RLR 1/2 TW infraplacement and exploration of NIRAV 1/23/20    Excellent vision and eye alignment. Has healed beautifully and is ready to return to regular eye care.   - No need for strabismus follow up at this time. I would be happy to see Marcy any time in the future as needed.      Thank you for the opportunity to care for Marcy. I have asked her to Return if symptoms worsen or fail to improve.  Until then, please do not hesitate to contact me or my clinic with any questions or concerns.          Warm regards,          Roxanna Diez MD                 Pediatric Ophthalmology & Strabismus        Department of Ophthalmology & Visual Neurosciences        [facility]   CC:  Marcy Diaz

## 2020-05-12 DIAGNOSIS — L70.0 ACNE VULGARIS: ICD-10-CM

## 2020-05-14 RX ORDER — SPIRONOLACTONE 50 MG/1
TABLET, FILM COATED ORAL
Qty: 90 TABLET | Refills: 2 | Status: SHIPPED | OUTPATIENT
Start: 2020-05-14 | End: 2020-08-20

## 2020-06-15 ENCOUNTER — TELEPHONE (OUTPATIENT)
Dept: PEDIATRICS | Facility: CLINIC | Age: 35
End: 2020-06-15

## 2020-06-15 NOTE — TELEPHONE ENCOUNTER
2nd attempt,    Mychart outreach message unread.        Left message for patient to return clinic call regarding scheduling. Patient needs a Physical  appointment for annual check up with Melly Christianson MD or Diya Ahmadi CNP  In July or August. Number to clinic and Mychart option given, please assist in scheduling once patient returns clinic call.     Call Center OKAY TO SCHEDULE.    Thanks,   Funmilayo Myers  Primary Care   [facility] [place]

## 2020-06-15 NOTE — LETTER
June 26, 2020      Marcy Diaz  [address] [facility] [address]        Dear Marcy Diaz,    In order to ensure that we are providing the best quality care, we would like to remind you that you are due for a Physical appointment with Melly Christianson MD or Diya Ahmadi CNP in July or August.      We have been unable to reach you by phone or Mychart. Please call your clinic or use Mychart to make an appointment with your provider before you run out of medication. This will prevent a delay in your next refill. Please let us know if you have any questions and we would be happy to help.     Thank you for trusting us with your care.    Sincerely,     [facility] [place] [facility] Team  [phone]

## 2020-06-26 NOTE — TELEPHONE ENCOUNTER
3rd attempt, chart letter created and sent.    Funmilayo Myers  Primary Care   [facility] [place]

## 2020-08-20 DIAGNOSIS — L70.0 ACNE VULGARIS: Primary | ICD-10-CM

## 2020-08-25 RX ORDER — SPIRONOLACTONE 50 MG/1
150 TABLET, FILM COATED ORAL DAILY
Qty: 90 TABLET | Refills: 0 | Status: SHIPPED | OUTPATIENT
Start: 2020-08-25 | End: 2020-09-21

## 2020-09-21 ENCOUNTER — VIRTUAL VISIT (OUTPATIENT)
Dept: DERMATOLOGY | Facility: CLINIC | Age: 35
End: 2020-09-21
Payer: COMMERCIAL

## 2020-09-21 DIAGNOSIS — L70.0 ACNE VULGARIS: Primary | ICD-10-CM

## 2020-09-21 RX ORDER — SPIRONOLACTONE 100 MG/1
200 TABLET, FILM COATED ORAL DAILY
Qty: 180 TABLET | Refills: 0 | Status: SHIPPED | OUTPATIENT
Start: 2020-09-21 | End: 2020-12-21

## 2020-09-21 ASSESSMENT — PAIN SCALES - GENERAL: PAINLEVEL: NO PAIN (0)

## 2020-09-21 NOTE — PROGRESS NOTES
"[facility] Dermatology Record:  Mychart Connected      Dermatology Problem List:  1. Acne, hormonal  -current tx: OCP (Ambar), BPO wash, differin 0.1% gel, spironolactone 200mg daily  -s/p spironolactone 100 mg daily & 150mg daily, BPO 3% wash, differin 0.1% cream, tretinoin 0.025% cream, clindamycin 1%      Encounter Date: Sep 21, 2020    CC:   Chief Complaint   Patient presents with     Derm Problem     Acne vulgaris - Marcy states she has been doing \"okay\" and the mask that she has to wear at work might be irritating her skin more     History of Present Illness:  Marcy Diaz is a 35 year old female who presents for an acne follow up. States she is doing \"ok\" her mask may be flaring her acne a bit she thinks, especially on the nose and chin. Currently on spironolactone 150mg daily, BPO 3% wash, differin 0.1% gel and takes her OCP. She is tolerating the spironolactone really well. She feels like her skin has improved a lot from increasing the dose of her spironolactone o 150mg daily, but that she is still getting acne breakouts monthly. Denies menstrual irregularities, breast tenderness, or excessive urination. The patient is using contraception - OCPs.  Doing well, no other concerns.     ROS: Patient is generally feeling well today   -GI: The patient denies nausea, vomiting, diarrhea or abdominal pain.  -: No changes in menstrual cycle, no breast tenderness, no change in urination  -CVS: no palpitations, dizziness  -Constitutional: no unintended weight loss/gain, no night sweats or fevers  -Skin: as per HPI    Physical Examination:  General: Well-appearing, appropriately-developed individual.  Skin: Focused examination including face was performed.   -overall improved, 2-3 erythematous papule on the L chin    Past Medical History:   Patient Active Problem List   Diagnosis     Acne     CARDIOVASCULAR SCREENING; LDL GOAL LESS THAN 160     Alternating exotropia     Hypertropia of right eye     Past Medical History: "   Diagnosis Date     Acne      ASCUS favor benign 2013    neg HPV; resolved     Depression with anxiety 2005     Exotropia      Past Surgical History:   Procedure Laterality Date     EXTRACTION(S) DENTAL       EYE SURGERY  1985 & 1998    strabismus correction     RECESSION RESECTION (REPAIR STRABISMUS) Right 1/23/2020    Procedure: Right strabismus surgery (- Exploration of right inferior oblique muscle,  - Right medial rectus advancement from 14 millimeters to 10.5 millimeters from the limbus, with one-half tendon width infraplacement,  - Right lateral rectus one-half tendon width infraplacement,  - Eye muscle surgery on previously operated extraocular muscles);  Surgeon: Roxanna Diez MD;  Location: [facility] OR       Social History:  Patient reports that she has never smoked. She has never used smokeless tobacco. She reports current alcohol use. She reports that she does not use drugs. .     Family History:  Family History   Problem Relation Age of Onset     C.A.D. Maternal Grandfather      Diabetes Maternal Grandfather      Hypertension Maternal Grandfather      Prostate Cancer Maternal Grandfather      C.A.D. Paternal Grandfather      Diabetes Paternal Grandfather      Hypertension Paternal Grandfather      Hypertension Mother      Thyroid Disease Mother         hypothyroidism     Breast Cancer Paternal Grandmother      Alcohol/Drug Father         alcohol     Cancer Father         skin     Asthma No family hx of      Cerebrovascular Disease No family hx of      Cancer - colorectal No family hx of      Lipids No family hx of      Depression No family hx of        Medications:  Current Outpatient Medications   Medication     drospirenone-ethinyl estradiol (LAUREN) 3-0.02 MG tablet     spironolactone (ALDACTONE) 50 MG tablet     No current facility-administered medications for this visit.        No Known Allergies    Impression and Recommendations (Patient Counseled on the Following):  1. Acne vulgaris, hormonal  -  Will increase spironolactone to 200mg po every day for 3mo - If patient is still having breakouts in 3mo will consider course of isotretinoin. Patient understands that hormonal acne is somewhat less responsive to isotretinoin and she may continue to get breakouts despite isotretinoin use. Prescription of spironolactone refilled today.   - Discussed risks of isotretinoin  - Continue differin 0.1% gel  - Continue cetaphil/cerave moisturizer and cleanser  - Aquaphor and/or Vaseline to lips nightly  - Briefly discussed fraxel laser tx for scarring on the lower face. Will consider once acne is well controlled on spironolactone or s/p isotretinoin.   - Patient will return in 3 months to decide if she will be starting accutane or continuing spironolactone if improvement is observed. Will discuss with . Discussed with patient the need for two forms of contraception - she is on an OCP but would need a back up form, likely condoms.   -Will recommend continuing with spironolactone if we decide to initiate treatment with isotretinoin    Follow-up:   Follow-up with dermatology in approximately 3mo. Earlier for new or changing lesions or rash.   CC Dr. Chacon on close of this encounter.     Staff only    All risks, benefits and alternatives were discussed with patient.  Patient is in agreement and understands the assessment and plan.  All questions were answered.    Mihaela John PA-C, MPAS  [facility] [place]: Phone: [phone], Fax: [phone]  [facility]: Phone: [phone],  Fax: [phone]  _____________________________________________________________________________    Teledermatology information:  - Location of patient: [place]  - Patient presented as: return  - Location of teledermatologist:  ([facility] DERMATOLOGY )  - Reason teledermatology is appropriate:  of National Emergency Regarding Coronavirus disease (COVID 19)  Outbreak  - Image quality and interpretability: acceptable  - Physician has received verbal consent for a Video/Photos Visit from the patient? Yes  - In-person dermatology visit recommendation: no  - Date of images: 9/21/20  - Length of call: 8min  - Date of report: 9/21/2020

## 2020-09-21 NOTE — NURSING NOTE
"Dermatology Rooming Note    Marcy Diaz's goals for this visit include:   Chief Complaint   Patient presents with     Derm Problem     Acne vulgaris - Marcy states she has been doing \"okay\" and the mask that she has to wear at work might be irritating her skin more     Bijan Laughlin CMA   "

## 2020-09-21 NOTE — PATIENT INSTRUCTIONS
[facility] Dermatology Visit    Thank you for allowing us to participate in your care. Your findings, instructions and follow-up plan are as follows:    Acne  -increase spironolactone to 200mg daily  -follow up in 3mo  Will consider isotretinoin if still with significant breakouts at the higher dose    When should I call my doctor?    If you are worsening or not improving, please, contact us or seek urgent care as noted below.     Who should I call with questions (adults)?    [facility] (adult and pediatric): [phone]     [facility] (adult): [phone]    For urgent needs outside of business hours call the [facility] at [phone] and ask for the dermatology resident on call    If this is a medical emergency and you are unable to reach an ER, Call [phone]      Who should I call with questions (pediatric)?  [facility]- Pediatric Dermatology  Dr. Bonita Martinez, Dr. Saida Sanchez, Dr. Dori Cisse, Melissa Sevilla, BRIDGETTE Pearson, Dr. Cecille Chacon & Dr. Claude Segal  Non Urgent  Nurse Triage Line; [phone]- Sneha and Ariana RN Care Coordinators   Jeanette (/Complex ) [phone]    If you need a prescription refill, please contact your pharmacy. Refills are approved or denied by our Physicians during normal business hours, Monday through Fridays  Per office policy, refills will not be granted if you have not been seen within the past year (or sooner depending on your child's condition)    Scheduling Information:  Pediatric Appointment Scheduling and Call Center [phone]  Radiology Scheduling- [phone]  Sedation Unit Scheduling- [phone]  [place] Scheduling- General [phone]; Pediatric Dermatology [phone]  Main  Services: [phone]  Tajik: [phone]  Citizen of [place]: [phone]  Hmong/Slovak/Swedish:  [phone]  Preadmission Nursing Department Fax Number: [phone] (Fax all pre-operative paperwork to this number)    For urgent matters arising during evenings, weekends, or holidays that cannot wait for normal business hours please call [phone] and ask for the Dermatology Resident On-Call to be paged.

## 2020-09-21 NOTE — LETTER
"9/21/2020       RE: Marcy Diaz  [address] [facility] [address] [address]     Dear Colleague,    Thank you for referring your patient, Marcy Diaz, to the [facility] DERMATOLOGY at [facility]. Please see a copy of my visit note below.    [facility] Dermatology Record:  Mychart Connected      Dermatology Problem List:  1. Acne, hormonal  -current tx: OCP (Ambar), BPO wash, differin 0.1% gel, spironolactone 200mg daily  -s/p spironolactone 100 mg daily & 150mg daily, BPO 3% wash, differin 0.1% cream, tretinoin 0.025% cream, clindamycin 1%      Encounter Date: Sep 21, 2020    CC:   Chief Complaint   Patient presents with     Derm Problem     Acne vulgaris - Marcy states she has been doing \"okay\" and the mask that she has to wear at work might be irritating her skin more     History of Present Illness:  Marcy Diaz is a 35 year old female who presents for an acne follow up. States she is doing \"ok\" her mask may be flaring her acne a bit she thinks, especially on the nose and chin. Currently on spironolactone 150mg daily, BPO 3% wash, differin 0.1% gel and takes her OCP. She is tolerating the spironolactone really well. She feels like her skin has improved a lot from increasing the dose of her spironolactone o 150mg daily, but that she is still getting acne breakouts monthly. Denies menstrual irregularities, breast tenderness, or excessive urination. The patient is using contraception - OCPs.  Doing well, no other concerns.     ROS: Patient is generally feeling well today   -GI: The patient denies nausea, vomiting, diarrhea or abdominal pain.  -: No changes in menstrual cycle, no breast tenderness, no change in urination  -CVS: no palpitations, dizziness  -Constitutional: no unintended weight loss/gain, no night sweats or fevers  -Skin: as per HPI    Physical Examination:  General: Well-appearing, appropriately-developed individual.  Skin: Focused examination including face was " performed.   -overall improved, 2-3 erythematous papule on the L chin    Past Medical History:   Patient Active Problem List   Diagnosis     Acne     CARDIOVASCULAR SCREENING; LDL GOAL LESS THAN 160     Alternating exotropia     Hypertropia of right eye     Past Medical History:   Diagnosis Date     Acne      ASCUS favor benign 2013    neg HPV; resolved     Depression with anxiety 2005     Exotropia      Past Surgical History:   Procedure Laterality Date     EXTRACTION(S) DENTAL       EYE SURGERY  1985 & 1998    strabismus correction     RECESSION RESECTION (REPAIR STRABISMUS) Right 1/23/2020    Procedure: Right strabismus surgery (- Exploration of right inferior oblique muscle,  - Right medial rectus advancement from 14 millimeters to 10.5 millimeters from the limbus, with one-half tendon width infraplacement,  - Right lateral rectus one-half tendon width infraplacement,  - Eye muscle surgery on previously operated extraocular muscles);  Surgeon: Roxanna Diez MD;  [facility] OR       Social History:  Patient reports that she has never smoked. She has never used smokeless tobacco. She reports current alcohol use. She reports that she does not use drugs. .     Family History:  Family History   Problem Relation Age of Onset     C.A.D. Maternal Grandfather      Diabetes Maternal Grandfather      Hypertension Maternal Grandfather      Prostate Cancer Maternal Grandfather      C.A.D. Paternal Grandfather      Diabetes Paternal Grandfather      Hypertension Paternal Grandfather      Hypertension Mother      Thyroid Disease Mother         hypothyroidism     Breast Cancer Paternal Grandmother      Alcohol/Drug Father         alcohol     Cancer Father         skin     Asthma No family hx of      Cerebrovascular Disease No family hx of      Cancer - colorectal No family hx of      Lipids No family hx of      Depression No family hx of        Medications:  Current Outpatient Medications   Medication      drospirenone-ethinyl estradiol (LAUREN) 3-0.02 MG tablet     spironolactone (ALDACTONE) 50 MG tablet     No current facility-administered medications for this visit.        No Known Allergies    Impression and Recommendations (Patient Counseled on the Following):  1. Acne vulgaris, hormonal  - Will increase spironolactone to 200mg po every day for 3mo - If patient is still having breakouts in 3mo will consider course of isotretinoin. Patient understands that hormonal acne is somewhat less responsive to isotretinoin and she may continue to get breakouts despite isotretinoin use. Prescription of spironolactone refilled today.   - Discussed risks of isotretinoin  - Continue differin 0.1% gel  - Continue cetaphil/cerave moisturizer and cleanser  - Aquaphor and/or Vaseline to lips nightly  - Briefly discussed fraxel laser tx for scarring on the lower face. Will consider once acne is well controlled on spironolactone or s/p isotretinoin.   - Patient will return in 3 months to decide if she will be starting accutane or continuing spironolactone if improvement is observed. Will discuss with . Discussed with patient the need for two forms of contraception - she is on an OCP but would need a back up form, likely condoms.   -Will recommend continuing with spironolactone if we decide to initiate treatment with isotretinoin    Follow-up:   Follow-up with dermatology in approximately 3mo. Earlier for new or changing lesions or rash.   CC Dr. Chacon on close of this encounter.     Staff only    All risks, benefits and alternatives were discussed with patient.  Patient is in agreement and understands the assessment and plan.  All questions were answered.    Mihaela John PA-C, MPAS  [facility] [place]: Phone: [phone], Fax: [phone]  [facility]: Phone: [phone],  Fax:  [phone]  _____________________________________________________________________________    Teledermatology information:  - Location of patient: [place]  - Patient presented as: return  - Location of teledermatologist:  [facility] DERMATOLOGY )  - Reason teledermatology is appropriate:  of National Emergency Regarding Coronavirus disease (COVID 19) Outbreak  - Image quality and interpretability: acceptable  - Physician has received verbal consent for a Video/Photos Visit from the patient? Yes  - In-person dermatology visit recommendation: no  - Date of images: 9/21/20  - Length of call: 8min  - Date of report: 9/21/2020

## 2020-10-25 ENCOUNTER — MYC REFILL (OUTPATIENT)
Dept: DERMATOLOGY | Facility: CLINIC | Age: 35
End: 2020-10-25

## 2020-10-25 DIAGNOSIS — L70.0 ACNE VULGARIS: ICD-10-CM

## 2020-10-27 NOTE — TELEPHONE ENCOUNTER
drospirenone-ethinyl estradiol (LAUREN) 3-0.02 MG tablet      Last Written Prescription Date:  7-  Last Fill Quantity: 28,   # refills: 2  Last Office Visit : 9-  Future Office visit:  12-    Routing refill request to provider for review/approval because:  Not on protocol.        Kathleen M Doege RN

## 2020-10-28 RX ORDER — DROSPIRENONE AND ETHINYL ESTRADIOL 0.02-3(28)
1 KIT ORAL DAILY
Qty: 30 TABLET | Refills: 11 | Status: SHIPPED | OUTPATIENT
Start: 2020-10-28 | End: 2021-11-01

## 2020-12-20 ENCOUNTER — HEALTH MAINTENANCE LETTER (OUTPATIENT)
Age: 35
End: 2020-12-20

## 2020-12-21 ENCOUNTER — VIRTUAL VISIT (OUTPATIENT)
Dept: DERMATOLOGY | Facility: CLINIC | Age: 35
End: 2020-12-21
Payer: COMMERCIAL

## 2020-12-21 DIAGNOSIS — L70.0 ACNE VULGARIS: ICD-10-CM

## 2020-12-21 PROCEDURE — 99213 OFFICE O/P EST LOW 20 MIN: CPT | Mod: 95 | Performed by: PHYSICIAN ASSISTANT

## 2020-12-21 RX ORDER — SPIRONOLACTONE 100 MG/1
200 TABLET, FILM COATED ORAL DAILY
Qty: 180 TABLET | Refills: 3 | Status: SHIPPED | OUTPATIENT
Start: 2020-12-21 | End: 2022-01-27

## 2020-12-21 NOTE — LETTER
"12/21/2020       RE: Marcy Diaz  [address] [facility] [address]     Dear Colleague,    Thank you for referring your patient, Marcy Diaz, to the [facility] DERMATOLOGY [facility] [place] at [facility]. Please see a copy of my visit note below.    [facility] Dermatology Record:  Store and Forward and Telephone [phone]      Dermatology Problem List:  1. Acne, hormonal  -current tx: OCP (Ambar), BPO wash, differin 0.1% gel, spironolactone 200mg daily  -s/p spironolactone 100 mg daily & 150mg daily, BPO 3% wash, differin 0.1% cream, tretinoin 0.025% cream, clindamycin 1%     Encounter Date: Dec 21, 2020    CC:   Chief Complaint   Patient presents with     Derm Problem     Acne - \"I think it's doing fine.\"     History of Present Illness:  Marcy Diaz is a 35 year old female who presents for  Acne follow up. Still has a few zits around her periods, but vastly much better than last visit. She has been tolerating the higher spironolactone dose well at 200mg daily. Continue with differin at night as well without problems. No excessive dryness. Denies menstrual irregularities, breast tenderness, or excessive urination. The patient is using contraception.     ROS: Patient is generally feeling well today   -GI: The patient denies nausea, vomiting, diarrhea or abdominal pain.  -: No changes in menstrual cycle, no breast tenderness, no change in urination  -CVS: no palpitations, dizziness  -Constitutional: no unintended weight loss/gain, no night sweats or fevers  -Skin as per HPI    Physical Examination:  General: Well-appearing, appropriately-developed individual.  Skin:  Exam was performed by photo review and is significant for the following:  -face: clear    Labs:  K+ and BUN/Cr reviewed from 01/2020 -nml    Past Medical History:   Patient Active Problem List   Diagnosis     Acne     CARDIOVASCULAR SCREENING; LDL GOAL LESS THAN 160     Alternating exotropia "     Hypertropia of right eye     Past Medical History:   Diagnosis Date     Acne      ASCUS favor benign 2013    neg HPV; resolved     Depression with anxiety 2005     Exotropia      Past Surgical History:   Procedure Laterality Date     EXTRACTION(S) DENTAL       EYE SURGERY  1985 & 1998    strabismus correction     RECESSION RESECTION (REPAIR STRABISMUS) Right 1/23/2020    Procedure: Right strabismus surgery (- Exploration of right inferior oblique muscle,  - Right medial rectus advancement from 14 millimeters to 10.5 millimeters from the limbus, with one-half tendon width infraplacement,  - Right lateral rectus one-half tendon width infraplacement,  - Eye muscle surgery on previously operated extraocular muscles);  Surgeon: Roxanna Diez MD;  Location: [facility] OR       Social History:  Patient reports that she has never smoked. She has never used smokeless tobacco. She reports current alcohol use. She reports that she does not use drugs.    Family History:  Family History   Problem Relation Age of Onset     C.A.D. Maternal Grandfather      Diabetes Maternal Grandfather      Hypertension Maternal Grandfather      Prostate Cancer Maternal Grandfather      C.A.D. Paternal Grandfather      Diabetes Paternal Grandfather      Hypertension Paternal Grandfather      Hypertension Mother      Thyroid Disease Mother         hypothyroidism     Breast Cancer Paternal Grandmother      Alcohol/Drug Father         alcohol     Cancer Father         skin     Asthma No family hx of      Cerebrovascular Disease No family hx of      Cancer - colorectal No family hx of      Lipids No family hx of      Depression No family hx of        Medications:  Current Outpatient Medications   Medication     drospirenone-ethinyl estradiol (LAUREN) 3-0.02 MG tablet     spironolactone (ALDACTONE) 100 MG tablet     No current facility-administered medications for this visit.        No Known Allergies    Impression and Recommendations (Patient Counseled on  the Following):  1. Acne - hormonal  -continue spironolactone 200mg daily- refilled for one year  -reminded patient of risks with contraceptiuon  -contraception: OCPs  -continue differin 0.1% gel nightly  -would consider isotretinoin in future if needed    Follow-up:   Follow-up with dermatology in approximately 1year. Earlier for new or changing lesions or rash.   CC Dr. Chacon on close of this encounter.      Staff only    All risks, benefits and alternatives were discussed with patient.  Patient is in agreement and understands the assessment and plan.  All questions were answered.    Mihaela John PA-C, MPAS  [facility] [place]: Phone: [phone], Fax: [phone]  [facility]: Phone: [phone],  Fax: [phone]  _____________________________________________________________________________    Teledermatology information:  - Patient presented as: return  - Location of teledermatologist:  ([facility] [place] )  - Image quality and interpretability: acceptable  - Physician has received verbal consent for a Video/Photos Visit from the patient? YES  - In-person dermatology visit recommendation: no  - Date of images: 12/21/20  - Length of call: 6min  - Date of report: 12/21/2020

## 2020-12-21 NOTE — PATIENT INSTRUCTIONS
[facility] Dermatology Visit    Thank you for allowing us to participate in your care. Your findings, instructions and follow-up plan are as follows:     See progress note    When should I call my doctor?    If you are worsening or not improving, please, contact us or seek urgent care as noted below.     Who should I call with questions (adults)?    [facility] (adult and pediatric): [phone]     [facility] (adult): [phone]    For urgent needs outside of business hours call the [facility] at [phone] and ask for the dermatology resident on call    If this is a medical emergency and you are unable to reach an ER, Call 911      Who should I call with questions (pediatric)?  [facility]- Pediatric Dermatology  Dr. Bonita Martinez, Dr. Saida Sanchez, Dr. Dori Cisse, Melissa Sevilla, PA  Dr. Joy Pearson, Dr. Cecille Chacon & Dr. Claude Segal  Non Urgent  Nurse Triage Line; [phone]- Sneha and Ariana GRIJALVA Care Coordinators   Jeanette (/Complex ) [phone]    If you need a prescription refill, please contact your pharmacy. Refills are approved or denied by our Physicians during normal business hours, Monday through Fridays  Per office policy, refills will not be granted if you have not been seen within the past year (or sooner depending on your child's condition)    Scheduling Information:  Pediatric Appointment Scheduling and Call Center [phone]  Radiology Scheduling- [phone]  Sedation Unit Scheduling- [phone]  [place] Scheduling- General [phone]; Pediatric Dermatology [phone]  Main  Services: [phone]  [place]se: [phone]  [place]ian: [phone]  Hmong/Samir/Malawian: [phone]  Preadmission Nursing Department Fax Number: [phone] (Fax all pre-operative paperwork to this number)    For urgent matters arising  during evenings, weekends, or holidays that cannot wait for normal business hours please call [phone] and ask for the Dermatology Resident On-Call to be paged.

## 2020-12-21 NOTE — PROGRESS NOTES
"[facility] Dermatology Record:  Store and Forward and Telephone [phone]      Dermatology Problem List:  1. Acne, hormonal  -current tx: OCP (Ambar), BPO wash, differin 0.1% gel, spironolactone 200mg daily  -s/p spironolactone 100 mg daily & 150mg daily, BPO 3% wash, differin 0.1% cream, tretinoin 0.025% cream, clindamycin 1%     Encounter Date: Dec 21, 2020    CC:   Chief Complaint   Patient presents with     Derm Problem     Acne - \"I think it's doing fine.\"     History of Present Illness:  Marcy Diaz is a 35 year old female who presents for  Acne follow up. Still has a few zits around her periods, but vastly much better than last visit. She has been tolerating the higher spironolactone dose well at 200mg daily. Continue with differin at night as well without problems. No excessive dryness. Denies menstrual irregularities, breast tenderness, or excessive urination. The patient is using contraception.     ROS: Patient is generally feeling well today   -GI: The patient denies nausea, vomiting, diarrhea or abdominal pain.  -: No changes in menstrual cycle, no breast tenderness, no change in urination  -CVS: no palpitations, dizziness  -Constitutional: no unintended weight loss/gain, no night sweats or fevers  -Skin as per HPI    Physical Examination:  General: Well-appearing, appropriately-developed individual.  Skin:  Exam was performed by photo review and is significant for the following:  -face: clear    Labs:  K+ and BUN/Cr reviewed from 01/2020 -nml    Past Medical History:   Patient Active Problem List   Diagnosis     Acne     CARDIOVASCULAR SCREENING; LDL GOAL LESS THAN 160     Alternating exotropia     Hypertropia of right eye     Past Medical History:   Diagnosis Date     Acne      ASCUS favor benign 2013    neg HPV; resolved     Depression with anxiety 2005     Exotropia      Past Surgical History:   Procedure Laterality Date     EXTRACTION(S) DENTAL       EYE SURGERY  1985 & 1998    strabismus " correction     RECESSION RESECTION (REPAIR STRABISMUS) Right 1/23/2020    Procedure: Right strabismus surgery (- Exploration of right inferior oblique muscle,  - Right medial rectus advancement from 14 millimeters to 10.5 millimeters from the limbus, with one-half tendon width infraplacement,  - Right lateral rectus one-half tendon width infraplacement,  - Eye muscle surgery on previously operated extraocular muscles);  Surgeon: Roxanna Diez MD;  Location: [facility] OR       Social History:  Patient reports that she has never smoked. She has never used smokeless tobacco. She reports current alcohol use. She reports that she does not use drugs.    Family History:  Family History   Problem Relation Age of Onset     C.A.D. Maternal Grandfather      Diabetes Maternal Grandfather      Hypertension Maternal Grandfather      Prostate Cancer Maternal Grandfather      C.A.D. Paternal Grandfather      Diabetes Paternal Grandfather      Hypertension Paternal Grandfather      Hypertension Mother      Thyroid Disease Mother         hypothyroidism     Breast Cancer Paternal Grandmother      Alcohol/Drug Father         alcohol     Cancer Father         skin     Asthma No family hx of      Cerebrovascular Disease No family hx of      Cancer - colorectal No family hx of      Lipids No family hx of      Depression No family hx of        Medications:  Current Outpatient Medications   Medication     drospirenone-ethinyl estradiol (LAUREN) 3-0.02 MG tablet     spironolactone (ALDACTONE) 100 MG tablet     No current facility-administered medications for this visit.        No Known Allergies    Impression and Recommendations (Patient Counseled on the Following):  1. Acne - hormonal  -continue spironolactone 200mg daily- refilled for one year  -reminded patient of risks with contraceptiuon  -contraception: OCPs  -continue differin 0.1% gel nightly  -would consider isotretinoin in future if needed    Follow-up:   Follow-up with dermatology in  approximately 1year. Earlier for new or changing lesions or rash.   CC Dr. Chacon on close of this encounter.      Staff only    All risks, benefits and alternatives were discussed with patient.  Patient is in agreement and understands the assessment and plan.  All questions were answered.    Mihaela John PA-C, MPAS  [facility]: Phone: [phone], Fax: [phone]  [facility]: Phone: [phone],  Fax: [phone]  _____________________________________________________________________________    Teledermatology information:  - Patient presented as: return  - Location of teledermatologist:  ([facility] DERMATOLOG[facility] [place] )  - Image quality and interpretability: acceptable  - Physician has received verbal consent for a Video/Photos Visit from the patient? YES  - In-person dermatology visit recommendation: no  - Date of images: 12/21/20  - Length of call: 6min  - Date of report: 12/21/2020

## 2021-04-12 NOTE — PROGRESS NOTES
SUBJECTIVE:   CC: Marcy Diaz is an 36 year old woman who presents for preventive health visit.         Patient has been advised of split billing requirements and indicates understanding: Yes       Healthy Habits:     Getting at least 3 servings of Calcium per day:  NO    Bi-annual eye exam:  NO    Dental care twice a year:  Yes    Sleep apnea or symptoms of sleep apnea:  None    Diet:  Regular (no restrictions)    Frequency of exercise:  None    Taking medications regularly:  Yes    Medication side effects:  Not applicable and None    PHQ-2 Total Score: 4    Additional concerns today:  Yes    Abnormal Mood Symptoms      Duration: Was treated for depression prior to having her 2 children, she was on celexa. Mood disorder with multiple stressors    Description:  Depression: Yes  Anxiety: No  Panic attacks: no     Accompanying signs and symptoms: see PHQ-9 and MONICA scores    History (similar episodes/previous evaluation): None    Precipitating or alleviating factors: None    Therapies tried and outcome: none      Abnormal Menstrual Symptoms  Patient complains of menstrual symptoms. She is currently on OCP and spironolactone for acne. Heavy bleeding with clots but no signs or symptoms of anemia  Symptoms began 3 months ago. Patient describes symptoms of anxiety (absent), bloating/fluid retention (absent), breast tenderness (absent), decreased libido (absent), depression (mild), dyspareunia (absent), insomnia (mild), labile mood (absent), menorrhagia (moderate), menstrual cramping (absent), migraine headaches (absent) and pelvic pain (absent). Symptoms occur with periods, which are usually 28 days apart and quite regular.         Today's PHQ-2 Score:   PHQ-2 ( 1999 [employer]) 4/13/2021   Q1: Little interest or pleasure in doing things 2   Q2: Feeling down, depressed or hopeless 2   PHQ-2 Score 4   Q1: Little interest or pleasure in doing things More than half the days   Q2: Feeling down, depressed or hopeless More  than half the days   PHQ-2 Score 4       Abuse: Current or Past (Physical, Sexual or Emotional) - No  Do you feel safe in your environment? Yes    Have you ever done Advance Care Planning? (For example, a Health Directive, POLST, or a discussion with a medical provider or your loved ones about your wishes): No, advance care planning information given to patient to review.  Advanced care planning was discussed at today's visit.    Social History     Tobacco Use     Smoking status: Never Smoker     Smokeless tobacco: Never Used   Substance Use Topics     Alcohol use: Yes         Alcohol Use 4/13/2021   Prescreen: >3 drinks/day or >7 drinks/week? No   Prescreen: >3 drinks/day or >7 drinks/week? -       Reviewed orders with patient.  Reviewed health maintenance and updated orders accordingly - Yes  BP Readings from Last 3 Encounters:   04/13/21 108/66   01/23/20 100/69   01/06/20 97/68    Wt Readings from Last 3 Encounters:   04/13/21 51.3 kg (113 lb)   01/23/20 50.8 kg (111 lb 15.9 oz)   01/06/20 52.6 kg (116 lb)                  Patient Active Problem List   Diagnosis     Acne     CARDIOVASCULAR SCREENING; LDL GOAL LESS THAN 160     Alternating exotropia     Hypertropia of right eye     Past Surgical History:   Procedure Laterality Date     EXTRACTION(S) DENTAL       EYE SURGERY  1985 & 1998    strabismus correction     RECESSION RESECTION (REPAIR STRABISMUS) Right 1/23/2020    Procedure: Right strabismus surgery (- Exploration of right inferior oblique muscle,  - Right medial rectus advancement from 14 millimeters to 10.5 millimeters from the limbus, with one-half tendon width infraplacement,  - Right lateral rectus one-half tendon width infraplacement,  - Eye muscle surgery on previously operated extraocular muscles);  Surgeon: Roxanna Diez MD;  Location:  OR       Social History     Tobacco Use     Smoking status: Never Smoker     Smokeless tobacco: Never Used   Substance Use Topics     Alcohol use: Yes      Family History   Problem Relation Age of Onset     C.A.D. Maternal Grandfather      Diabetes Maternal Grandfather      Hypertension Maternal Grandfather      Prostate Cancer Maternal Grandfather      C.A.D. Paternal Grandfather      Diabetes Paternal Grandfather      Hypertension Paternal Grandfather      Hypertension Mother      Thyroid Disease Mother         hypothyroidism     Breast Cancer Paternal Grandmother      Alcohol/Drug Father         alcohol     Cancer Father         skin     Asthma No family hx of      Cerebrovascular Disease No family hx of      Cancer - colorectal No family hx of      Lipids No family hx of      Depression No family hx of          Current Outpatient Medications   Medication Sig Dispense Refill     drospirenone-ethinyl estradiol (LAUREN) 3-0.02 MG tablet Take 1 tablet by mouth daily 30 tablet 11     escitalopram (LEXAPRO) 10 MG tablet 5 mg daily for 1 week and then 10 mg daily 45 tablet 0     loratadine (CLARITIN) 10 MG tablet Take 10 mg by mouth daily       spironolactone (ALDACTONE) 100 MG tablet Take 2 tablets (200 mg) by mouth daily 180 tablet 3     No Known Allergies  Recent Labs   Lab Test 01/06/20  1717 07/23/13  1725   LDL  --  114   HDL  --  56   TRIG  --  77   CR 0.66  --    GFRESTIMATED >90  --    GFRESTBLACK >90  --    POTASSIUM 4.1  --         Breast Cancer Screening:  Any new diagnosis of family breast, ovarian, or bowel cancer? Yes       FSH-7:   Breast CA Risk Assessment (FHS-7) 4/13/2021   Did any of your first-degree relatives have breast or ovarian cancer? No   Did any of your relatives have bilateral breast cancer? No   Did any man in your family have breast cancer? No   Did any woman in your family have breast and ovarian cancer? Yes   Did any woman in your family have breast cancer before age 50 y? No   Do you have 2 or more relatives with breast and/or ovarian cancer? Yes   Do you have 2 or more relatives with breast and/or bowel cancer? No     click delete button  to remove this line now  Mammogram Screening - Offered annual screening and updated Health Maintenance for mutual plan based on risk factor consideration    Pertinent mammograms are reviewed under the imaging tab.    History of abnormal Pap smear: NO - age 21-29 PAP every 3 years recommended  NO - age 30- 65 PAP every 3 years recommended  NO - age 30-65 PAP every 5 years with negative HPV co-testing recommended  PAP / HPV Latest Ref Rng & Units 2015   PAP - NIL ASC-US(A)   HPV 16 DNA NEG Negative -   HPV 18 DNA NEG Negative -   OTHER HR HPV NEG Negative -     Reviewed and updated as needed this visit by clinical staff  Tobacco  Allergies  Meds   Med Hx  Surg Hx  Fam Hx  Soc Hx        Reviewed and updated as needed this visit by Provider  Tobacco   Meds   Med Hx  Surg Hx  Fam Hx  Soc Hx       Past Medical History:   Diagnosis Date     Acne      ASCUS favor benign     neg HPV; resolved     Depression with anxiety      Exotropia       Past Surgical History:   Procedure Laterality Date     EXTRACTION(S) DENTAL       EYE SURGERY   &     strabismus correction     RECESSION RESECTION (REPAIR STRABISMUS) Right 2020    Procedure: Right strabismus surgery (- Exploration of right inferior oblique muscle,  - Right medial rectus advancement from 14 millimeters to 10.5 millimeters from the limbus, with one-half tendon width infraplacement,  - Right lateral rectus one-half tendon width infraplacement,  - Eye muscle surgery on previously operated extraocular muscles);  Surgeon: Roxanna Diez MD;  [facility]     OB History    Para Term  AB Living   2 2 2 0 0 2   SAB TAB Ectopic Multiple Live Births   0 0 0 0 2      # Outcome Date GA Lbr Tommy/2nd Weight Sex Delivery Anes PTL Lv   2 Term 18 39w5d 09:00 / 00:30 3.147 kg (6 lb 15 oz) F  EPI  DWAYNE      Name: [place]      Apgar1: 8  Apgar5: 9   1 Term 11/03/15 41w0d 05:00 / 00:30 3.23 kg (7 lb 1.9 oz) F Vag-Vacuum  "EPI  DWAYNE      Complications: Fetal Intolerance, Dysfunctional Labor, Vaginal laceration, Third degree perineal laceration, delivered, current hospitalization      Name: Dinorah      Apgar1: 7  Apgar5: 9       Review of Systems   Constitutional: Negative for chills and fever.   HENT: Positive for congestion. Negative for ear pain, hearing loss and sore throat.    Eyes: Negative for pain and visual disturbance.   Respiratory: Negative for cough and shortness of breath.    Cardiovascular: Negative for chest pain, palpitations and peripheral edema.   Gastrointestinal: Negative for abdominal pain, constipation, diarrhea, heartburn, hematochezia and nausea.   Breasts:  Negative for tenderness, breast mass and discharge.   Genitourinary: Positive for vaginal bleeding. Negative for dysuria, frequency, genital sores, hematuria, pelvic pain, urgency and vaginal discharge.   Musculoskeletal: Negative for arthralgias, joint swelling and myalgias.   Skin: Negative for rash.   Neurological: Negative for dizziness, weakness, headaches and paresthesias.   Psychiatric/Behavioral: Positive for mood changes. The patient is not nervous/anxious.           OBJECTIVE:   /66 (BP Location: Left arm, Patient Position: Chair, Cuff Size: Adult Regular)   Pulse 83   Temp 97.4  F (36.3  C) (Temporal)   Resp 16   Ht 1.689 m (5' 6.5\")   Wt 51.3 kg (113 lb)   LMP 03/20/2021 (Approximate)   SpO2 100%   Breastfeeding No   BMI 17.97 kg/m    Physical Exam  GENERAL: healthy, alert and no distress  EYES: Eyes grossly normal to inspection, PERRL and conjunctivae and sclerae normal  HENT: ear canals and TM's normal, nose and mouth without ulcers or lesions  NECK: no adenopathy, no asymmetry, masses, or scars and thyroid normal to palpation  RESP: lungs clear to auscultation - no rales, rhonchi or wheezes  BREAST: normal without masses, tenderness or nipple discharge and no palpable axillary masses or adenopathy  CV: regular rate and rhythm, " normal S1 S2, no S3 or S4, no murmur, click or rub, no peripheral edema and peripheral pulses strong  ABDOMEN: soft, nontender, no hepatosplenomegaly, no masses and bowel sounds normal   (female): normal female external genitalia, normal urethral meatus, vaginal mucosa pink, moist, well rugated, and normal cervix/adnexa/uterus without masses or discharge  MS: no gross musculoskeletal defects noted, no edema  SKIN: no suspicious lesions or rashes  NEURO: Normal strength and tone, mentation intact and speech normal  PSYCH: mentation appears normal, affect normal/bright      ASSESSMENT/PLAN:   1. Routine general medical examination at a health care facility  See counseling    2. Dysthymia  We discussed the treatment for anxiety and depression in detail.  The importance of a multi faceted approach in controlling symptoms was reviewed.  The benefits of cognitive behavioral therapy reviewed, benefits of exercise, and stress reduction also discussed.       Duration of treatment is at least 9 months with medication. It may take 3-4 weeks before symptom improvement happens.  Do not stop medication suddenly, medication will need to be tapered off.  Slight increased risk of suicide with SSRI group of medications discussed.       I ended our visit today by discussing the patient's diagnoses and recommended treatment. Please refer to today's diagnoses and orders for further details. I briefly discussed the pathophysiology of these conditions and outlined their expected course. I discussed the warning symptoms and signs that indicate an atypical course that would need urgent or emergent care. I also discussed self care strategies for symptom relief.  Patient voiced complete understanding of plan of care and was in full agreement to proceed. After visit summary discussed and handed to patient.    Common side effects of medications prescribed at this visit were discussed with the patient. Severe side effects, including current  "applicable black box warnings, were discussed.      - escitalopram (LEXAPRO) 10 MG tablet; 5 mg daily for 1 week and then 10 mg daily  Dispense: 45 tablet; Refill: 0  - MENTAL HEALTH REFERRAL  - Adult; Outpatient Treatment; Individual/Couples/Family/Group Therapy/Health Psychology; [facility]: [facility] [phone]; We will contact you to schedule the appointment or please call with any questions    3. DUB (dysfunctional uterine bleeding)  - US Pelvic Complete with Transvaginal; Future    4. Need for hepatitis C screening test  - Hepatitis C Screen Reflex to HCV RNA Quant and Genotype    5. Screening for malignant neoplasm of cervix  - Pap imaged thin layer screen with HPV - recommended age 30 - 65 years (select HPV order below)  - HPV High Risk Types DNA Cervical    6. Screening for endocrine, metabolic and immunity disorder  - Glucose  - TSH with free T4 reflex  - Vitamin D Deficiency    7. CARDIOVASCULAR SCREENING; LDL GOAL LESS THAN 100  - Lipid panel reflex to direct LDL Non-fasting    Patient has been advised of split billing requirements and indicates understanding: Yes  COUNSELING:  Reviewed preventive health counseling, as reflected in patient instructions       Regular exercise       Healthy diet/nutrition       Vision screening       Hearing screening       Contraception       Osteoporosis prevention/bone health       Colon cancer screening       Consider Hep C screening for all patients one time for ages 18-79 years       (Gracy)menopause management       Advance Care Planning    Estimated body mass index is 17.97 kg/m  as calculated from the following:    Height as of this encounter: 1.689 m (5' 6.5\").    Weight as of this encounter: 51.3 kg (113 lb).    Weight management plan noted, stable and monitoring    She reports that she has never smoked. She has never used smokeless tobacco.      Counseling Resources:  ATP IV Guidelines  Pooled Cohorts Equation Calculator  Breast Cancer Risk " Calculator  BRCA-Related Cancer Risk Assessment: FHS-7 Tool  FRAX Risk Assessment  ICSI Preventive Guidelines  Dietary Guidelines for Americans, 2010  [employer] MyPlate  ASA Prophylaxis  Lung CA Screening    Melly Christianson MD  [facility]  Answers for HPI/ROS submitted by the patient on 4/13/2021   Annual Exam:  If you checked off any problems, how difficult have these problems made it for you to do your work, take care of things at home, or get along with other people?: Very difficult  PHQ9 TOTAL SCORE: 11

## 2021-04-13 ENCOUNTER — OFFICE VISIT (OUTPATIENT)
Dept: FAMILY MEDICINE | Facility: CLINIC | Age: 36
End: 2021-04-13
Payer: COMMERCIAL

## 2021-04-13 VITALS
WEIGHT: 113 LBS | HEART RATE: 83 BPM | SYSTOLIC BLOOD PRESSURE: 108 MMHG | DIASTOLIC BLOOD PRESSURE: 66 MMHG | TEMPERATURE: 97.4 F | BODY MASS INDEX: 17.74 KG/M2 | OXYGEN SATURATION: 100 % | HEIGHT: 67 IN | RESPIRATION RATE: 16 BRPM

## 2021-04-13 DIAGNOSIS — Z13.0 SCREENING FOR ENDOCRINE, METABOLIC AND IMMUNITY DISORDER: ICD-10-CM

## 2021-04-13 DIAGNOSIS — Z12.4 SCREENING FOR MALIGNANT NEOPLASM OF CERVIX: ICD-10-CM

## 2021-04-13 DIAGNOSIS — Z11.59 NEED FOR HEPATITIS C SCREENING TEST: ICD-10-CM

## 2021-04-13 DIAGNOSIS — F34.1 DYSTHYMIA: ICD-10-CM

## 2021-04-13 DIAGNOSIS — N93.8 DUB (DYSFUNCTIONAL UTERINE BLEEDING): ICD-10-CM

## 2021-04-13 DIAGNOSIS — Z13.228 SCREENING FOR ENDOCRINE, METABOLIC AND IMMUNITY DISORDER: ICD-10-CM

## 2021-04-13 DIAGNOSIS — Z00.00 ROUTINE GENERAL MEDICAL EXAMINATION AT A HEALTH CARE FACILITY: Primary | ICD-10-CM

## 2021-04-13 DIAGNOSIS — Z13.6 CARDIOVASCULAR SCREENING; LDL GOAL LESS THAN 100: ICD-10-CM

## 2021-04-13 DIAGNOSIS — Z13.29 SCREENING FOR ENDOCRINE, METABOLIC AND IMMUNITY DISORDER: ICD-10-CM

## 2021-04-13 LAB
CHOLEST SERPL-MCNC: 207 MG/DL
GLUCOSE SERPL-MCNC: 69 MG/DL (ref 70–99)
HDLC SERPL-MCNC: 67 MG/DL
LDLC SERPL CALC-MCNC: 102 MG/DL
NONHDLC SERPL-MCNC: 140 MG/DL
T4 FREE SERPL-MCNC: 1.11 NG/DL (ref 0.76–1.46)
TRIGL SERPL-MCNC: 188 MG/DL
TSH SERPL DL<=0.005 MIU/L-ACNC: 5.96 MU/L (ref 0.4–4)

## 2021-04-13 PROCEDURE — 82947 ASSAY GLUCOSE BLOOD QUANT: CPT | Performed by: FAMILY MEDICINE

## 2021-04-13 PROCEDURE — 82306 VITAMIN D 25 HYDROXY: CPT | Performed by: FAMILY MEDICINE

## 2021-04-13 PROCEDURE — 84443 ASSAY THYROID STIM HORMONE: CPT | Performed by: FAMILY MEDICINE

## 2021-04-13 PROCEDURE — 86803 HEPATITIS C AB TEST: CPT | Performed by: FAMILY MEDICINE

## 2021-04-13 PROCEDURE — 36415 COLL VENOUS BLD VENIPUNCTURE: CPT | Performed by: FAMILY MEDICINE

## 2021-04-13 PROCEDURE — G0145 SCR C/V CYTO,THINLAYER,RESCR: HCPCS | Performed by: FAMILY MEDICINE

## 2021-04-13 PROCEDURE — 84439 ASSAY OF FREE THYROXINE: CPT | Performed by: FAMILY MEDICINE

## 2021-04-13 PROCEDURE — 99395 PREV VISIT EST AGE 18-39: CPT | Performed by: FAMILY MEDICINE

## 2021-04-13 PROCEDURE — 99213 OFFICE O/P EST LOW 20 MIN: CPT | Mod: 25 | Performed by: FAMILY MEDICINE

## 2021-04-13 PROCEDURE — 80061 LIPID PANEL: CPT | Performed by: FAMILY MEDICINE

## 2021-04-13 PROCEDURE — 87624 HPV HI-RISK TYP POOLED RSLT: CPT | Performed by: FAMILY MEDICINE

## 2021-04-13 RX ORDER — LORATADINE 10 MG/1
10 TABLET ORAL DAILY
COMMUNITY
End: 2022-09-02

## 2021-04-13 RX ORDER — ESCITALOPRAM OXALATE 10 MG/1
TABLET ORAL
Qty: 45 TABLET | Refills: 0 | Status: SHIPPED | OUTPATIENT
Start: 2021-04-13 | End: 2021-05-26

## 2021-04-13 ASSESSMENT — ENCOUNTER SYMPTOMS
CONSTIPATION: 0
NERVOUS/ANXIOUS: 0
PARESTHESIAS: 0
HEMATURIA: 0
NAUSEA: 0
COUGH: 0
BREAST MASS: 0
ARTHRALGIAS: 0
DIZZINESS: 0
DYSURIA: 0
HEADACHES: 0
HEARTBURN: 0
PALPITATIONS: 0
SORE THROAT: 0
SHORTNESS OF BREATH: 0
FREQUENCY: 0
CHILLS: 0
HEMATOCHEZIA: 0
JOINT SWELLING: 0
FEVER: 0
EYE PAIN: 0
DIARRHEA: 0
ABDOMINAL PAIN: 0
WEAKNESS: 0
MYALGIAS: 0

## 2021-04-13 ASSESSMENT — PATIENT HEALTH QUESTIONNAIRE - PHQ9
SUM OF ALL RESPONSES TO PHQ QUESTIONS 1-9: 11
10. IF YOU CHECKED OFF ANY PROBLEMS, HOW DIFFICULT HAVE THESE PROBLEMS MADE IT FOR YOU TO DO YOUR WORK, TAKE CARE OF THINGS AT HOME, OR GET ALONG WITH OTHER PEOPLE: VERY DIFFICULT
SUM OF ALL RESPONSES TO PHQ QUESTIONS 1-9: 11

## 2021-04-13 ASSESSMENT — MIFFLIN-ST. JEOR: SCORE: 1222.25

## 2021-04-13 ASSESSMENT — PAIN SCALES - GENERAL: PAINLEVEL: NO PAIN (0)

## 2021-04-14 DIAGNOSIS — R79.89 ELEVATED TSH: Primary | ICD-10-CM

## 2021-04-14 DIAGNOSIS — E03.8 SUBCLINICAL HYPOTHYROIDISM: ICD-10-CM

## 2021-04-14 ASSESSMENT — PATIENT HEALTH QUESTIONNAIRE - PHQ9: SUM OF ALL RESPONSES TO PHQ QUESTIONS 1-9: 11

## 2021-04-15 LAB
COPATH REPORT: NORMAL
DEPRECATED CALCIDIOL+CALCIFEROL SERPL-MC: 27 UG/L (ref 20–75)
HCV AB SERPL QL IA: NONREACTIVE
PAP: NORMAL

## 2021-04-16 LAB
FINAL DIAGNOSIS: NORMAL
HPV HR 12 DNA CVX QL NAA+PROBE: NEGATIVE
HPV16 DNA SPEC QL NAA+PROBE: NEGATIVE
HPV18 DNA SPEC QL NAA+PROBE: NEGATIVE
SPECIMEN DESCRIPTION: NORMAL
SPECIMEN SOURCE CVX/VAG CYTO: NORMAL

## 2021-04-30 ENCOUNTER — ANCILLARY PROCEDURE (OUTPATIENT)
Dept: ULTRASOUND IMAGING | Facility: CLINIC | Age: 36
End: 2021-04-30
Attending: FAMILY MEDICINE
Payer: COMMERCIAL

## 2021-04-30 DIAGNOSIS — N93.8 DUB (DYSFUNCTIONAL UTERINE BLEEDING): ICD-10-CM

## 2021-04-30 PROCEDURE — 76830 TRANSVAGINAL US NON-OB: CPT

## 2021-04-30 PROCEDURE — 76856 US EXAM PELVIC COMPLETE: CPT

## 2021-05-26 ENCOUNTER — OFFICE VISIT (OUTPATIENT)
Dept: FAMILY MEDICINE | Facility: CLINIC | Age: 36
End: 2021-05-26
Payer: COMMERCIAL

## 2021-05-26 VITALS
BODY MASS INDEX: 18.27 KG/M2 | WEIGHT: 116.4 LBS | TEMPERATURE: 97.4 F | RESPIRATION RATE: 18 BRPM | SYSTOLIC BLOOD PRESSURE: 118 MMHG | HEART RATE: 63 BPM | DIASTOLIC BLOOD PRESSURE: 74 MMHG | HEIGHT: 67 IN | OXYGEN SATURATION: 100 %

## 2021-05-26 DIAGNOSIS — F34.1 DYSTHYMIA: Primary | ICD-10-CM

## 2021-05-26 PROCEDURE — 99213 OFFICE O/P EST LOW 20 MIN: CPT | Performed by: FAMILY MEDICINE

## 2021-05-26 RX ORDER — ESCITALOPRAM OXALATE 10 MG/1
10 TABLET ORAL DAILY
Qty: 90 TABLET | Refills: 1 | Status: SHIPPED | OUTPATIENT
Start: 2021-05-26 | End: 2021-12-24

## 2021-05-26 ASSESSMENT — ANXIETY QUESTIONNAIRES
4. TROUBLE RELAXING: MORE THAN HALF THE DAYS
2. NOT BEING ABLE TO STOP OR CONTROL WORRYING: SEVERAL DAYS
6. BECOMING EASILY ANNOYED OR IRRITABLE: SEVERAL DAYS
GAD7 TOTAL SCORE: 7
7. FEELING AFRAID AS IF SOMETHING AWFUL MIGHT HAPPEN: SEVERAL DAYS
GAD7 TOTAL SCORE: 7
GAD7 TOTAL SCORE: 7
3. WORRYING TOO MUCH ABOUT DIFFERENT THINGS: SEVERAL DAYS
1. FEELING NERVOUS, ANXIOUS, OR ON EDGE: SEVERAL DAYS
5. BEING SO RESTLESS THAT IT IS HARD TO SIT STILL: NOT AT ALL
7. FEELING AFRAID AS IF SOMETHING AWFUL MIGHT HAPPEN: SEVERAL DAYS

## 2021-05-26 ASSESSMENT — PATIENT HEALTH QUESTIONNAIRE - PHQ9
10. IF YOU CHECKED OFF ANY PROBLEMS, HOW DIFFICULT HAVE THESE PROBLEMS MADE IT FOR YOU TO DO YOUR WORK, TAKE CARE OF THINGS AT HOME, OR GET ALONG WITH OTHER PEOPLE: SOMEWHAT DIFFICULT
SUM OF ALL RESPONSES TO PHQ QUESTIONS 1-9: 4
SUM OF ALL RESPONSES TO PHQ QUESTIONS 1-9: 4

## 2021-05-26 ASSESSMENT — MIFFLIN-ST. JEOR: SCORE: 1237.68

## 2021-05-26 NOTE — PROGRESS NOTES
"    Assessment & Plan     Dysthymia  Improved, continue present management  - escitalopram (LEXAPRO) 10 MG tablet; Take 1 tablet (10 mg) by mouth daily        No follow-ups on file.    Melly Christianson MD  [facility] MILY Vidal is a 36 year old who presents for the following health issues  accompanied by her Self:    History of Present Illness       Mental Health Follow-up:  Patient presents to follow-up on Depression & Anxiety.Patient's depression since last visit has been:  Better  The patient is having other symptoms associated with depression.  Patient's anxiety since last visit has been:  Better  The patient is having other symptoms associated with anxiety.  Any significant life events: No  Patient is not feeling anxious or having panic attacks.  Patient has no concerns about alcohol or drug use.     Social History  Tobacco Use    Smoking status: Never Smoker    Smokeless tobacco: Never Used  Alcohol use: Yes  Drug use: No      Today's PHQ-9         PHQ-9 Total Score:     (P) 4   PHQ-9 Q9 Thoughts of better off dead/self-harm past 2 weeks :   (P) Not at all   Thoughts of suicide or self harm:      Self-harm Plan:        Self-harm Action:          Safety concerns for self or others:              Review of Systems         Objective    /74   Pulse 63   Temp 97.4  F (36.3  C) (Temporal)   Resp 18   Ht 1.689 m (5' 6.5\")   Wt 52.8 kg (116 lb 6.4 oz)   LMP 05/24/2021 (Exact Date)   SpO2 100%   Breastfeeding No   BMI 18.51 kg/m    Body mass index is 18.51 kg/m .  Physical Exam  Constitutional:       Appearance: Normal appearance.   Cardiovascular:      Rate and Rhythm: Normal rate and regular rhythm.   Abdominal:      Palpations: Abdomen is soft.   Neurological:      Mental Status: She is alert.   Psychiatric:         Mood and Affect: Mood normal.                Answers for HPI/ROS submitted by the patient on 5/26/2021   Chronic problems general questions HPI Form  If " you checked off any problems, how difficult have these problems made it for you to do your work, take care of things at home, or get along with other people?: Somewhat difficult  PHQ9 TOTAL SCORE: 4  MONICA 7 TOTAL SCORE: 7

## 2021-05-27 ASSESSMENT — ANXIETY QUESTIONNAIRES: GAD7 TOTAL SCORE: 7

## 2021-05-27 ASSESSMENT — PATIENT HEALTH QUESTIONNAIRE - PHQ9: SUM OF ALL RESPONSES TO PHQ QUESTIONS 1-9: 4

## 2021-08-18 DIAGNOSIS — L70.0 ACNE VULGARIS: ICD-10-CM

## 2021-08-20 RX ORDER — DROSPIRENONE AND ETHINYL ESTRADIOL 0.02-3(28)
1 KIT ORAL DAILY
Qty: 28 TABLET | OUTPATIENT
Start: 2021-08-20

## 2021-08-20 NOTE — TELEPHONE ENCOUNTER
DROSPIRENONE/ETHY EST 3/0.02MG T 28  Last Written Prescription Date:  10/28/20  Last Fill Quantity: 30,   # refills: 11  Last Office Visit : 12/21/20  Future Office visit:  1 year  [id number] - [place], [place] - [address] AT [facility]

## 2021-09-26 ENCOUNTER — TELEPHONE (OUTPATIENT)
Dept: FAMILY MEDICINE | Facility: CLINIC | Age: 36
End: 2021-09-26

## 2021-09-27 NOTE — TELEPHONE ENCOUNTER
LMTC, please see message below and assist with scheduling  Thanks  Sandra Chen RT (R)       Please have patient schedule lab appointment for TSH. Thank you.

## 2021-10-03 ENCOUNTER — HEALTH MAINTENANCE LETTER (OUTPATIENT)
Age: 36
End: 2021-10-03

## 2021-10-05 ENCOUNTER — LAB (OUTPATIENT)
Dept: LAB | Facility: CLINIC | Age: 36
End: 2021-10-05
Payer: COMMERCIAL

## 2021-10-05 DIAGNOSIS — R79.89 ELEVATED TSH: ICD-10-CM

## 2021-10-05 DIAGNOSIS — E03.8 SUBCLINICAL HYPOTHYROIDISM: ICD-10-CM

## 2021-10-05 LAB
T4 FREE SERPL-MCNC: 1.05 NG/DL (ref 0.76–1.46)
TSH SERPL DL<=0.005 MIU/L-ACNC: 6.71 MU/L (ref 0.4–4)

## 2021-10-05 PROCEDURE — 36415 COLL VENOUS BLD VENIPUNCTURE: CPT

## 2021-10-05 PROCEDURE — 84439 ASSAY OF FREE THYROXINE: CPT

## 2021-10-05 PROCEDURE — 84443 ASSAY THYROID STIM HORMONE: CPT

## 2021-12-21 ENCOUNTER — E-VISIT (OUTPATIENT)
Dept: URGENT CARE | Facility: CLINIC | Age: 36
End: 2021-12-21
Payer: COMMERCIAL

## 2021-12-21 DIAGNOSIS — J01.90 ACUTE BACTERIAL SINUSITIS: Primary | ICD-10-CM

## 2021-12-21 DIAGNOSIS — B96.89 ACUTE BACTERIAL SINUSITIS: Primary | ICD-10-CM

## 2021-12-21 PROCEDURE — 99421 OL DIG E/M SVC 5-10 MIN: CPT | Performed by: EMERGENCY MEDICINE

## 2021-12-21 NOTE — PATIENT INSTRUCTIONS
Dear Marcy Diaz    After reviewing your responses, I've been able to diagnose you with   a sinus infection caused by bacteria.?     Based on your responses and diagnosis, I have prescribed Augmentin to treat your symptoms. I have sent this to your pharmacy.?     It is also important to stay well hydrated, get lots of rest and take over-the-counter decongestants,?tylenol?or ibuprofen if you?are able to?take those medications per your primary care provider to help relieve discomfort.?     It is important that you take?all of?your prescribed medication even if your symptoms are improving after a few doses.? Taking?all of?your medicine helps prevent the symptoms from returning.?     If your symptoms worsen, you develop severe headache, vomiting, high fever (>102), or are not improving in 7 days, please contact your primary care provider for an appointment or visit any of our convenient Walk-in Care or Urgent Care Centers to be seen which can be found on our website?here.?     Thanks again for choosing?us?as your health care partner,?   ?  Raj Bernstein MD?

## 2021-12-22 DIAGNOSIS — F34.1 DYSTHYMIA: ICD-10-CM

## 2021-12-24 RX ORDER — ESCITALOPRAM OXALATE 10 MG/1
10 TABLET ORAL DAILY
Qty: 90 TABLET | Refills: 3 | Status: SHIPPED | OUTPATIENT
Start: 2021-12-24 | End: 2022-09-02

## 2021-12-24 NOTE — TELEPHONE ENCOUNTER
Pending Prescriptions:                       Disp   Refills    escitalopram (LEXAPRO) 10 MG tablet        90 tab*1        Sig: Take 1 tablet (10 mg) by mouth daily    Routing refill request to provider for review/approval because:  Labs not current:  PHQ-9 score:    PHQ 5/26/2021   PHQ-9 Total Score 4   Q9: Thoughts of better off dead/self-harm past 2 weeks Not at all     Patient needs to be seen because:  It has been over 6 months since last visit.     escitalopram (LEXAPRO) 10 MG tablet 90 tablet 1 5/26/2021  --   Sig - Route: Take 1 tablet (10 mg) by mouth daily - Oral   Sent to pharmacy as: Escitalopram Oxalate 10 MG Oral Tablet (LEXAPRO)   Class: E-Prescribe   Order: [id number]   E-Prescribing Status: Receipt confirmed by pharmacy (5/26/2021  3:29 PM CDT)     Rossy Bowling RN on 12/24/2021 at 10:44 AM

## 2022-01-23 DIAGNOSIS — L70.0 ACNE VULGARIS: ICD-10-CM

## 2022-01-27 RX ORDER — SPIRONOLACTONE 100 MG/1
TABLET, FILM COATED ORAL
Qty: 180 TABLET | Refills: 3 | Status: SHIPPED | OUTPATIENT
Start: 2022-01-27 | End: 2022-02-09

## 2022-02-09 ENCOUNTER — OFFICE VISIT (OUTPATIENT)
Dept: DERMATOLOGY | Facility: CLINIC | Age: 37
End: 2022-02-09
Payer: COMMERCIAL

## 2022-02-09 VITALS — SYSTOLIC BLOOD PRESSURE: 106 MMHG | DIASTOLIC BLOOD PRESSURE: 72 MMHG | HEART RATE: 82 BPM

## 2022-02-09 DIAGNOSIS — L28.0 LSC (LICHEN SIMPLEX CHRONICUS): Primary | ICD-10-CM

## 2022-02-09 DIAGNOSIS — D18.01 CHERRY ANGIOMA: ICD-10-CM

## 2022-02-09 DIAGNOSIS — D22.9 MULTIPLE BENIGN NEVI: ICD-10-CM

## 2022-02-09 DIAGNOSIS — L81.4 SOLAR LENTIGO: ICD-10-CM

## 2022-02-09 DIAGNOSIS — L70.0 ACNE VULGARIS: ICD-10-CM

## 2022-02-09 PROCEDURE — 99213 OFFICE O/P EST LOW 20 MIN: CPT | Performed by: PHYSICIAN ASSISTANT

## 2022-02-09 RX ORDER — SPIRONOLACTONE 100 MG/1
TABLET, FILM COATED ORAL
Qty: 180 TABLET | Refills: 3 | Status: SHIPPED | OUTPATIENT
Start: 2022-02-09 | End: 2023-02-27

## 2022-02-09 NOTE — LETTER
2/9/2022         RE: Marcy Diaz  [address] [facility] [address]        Dear Colleague,    Thank you for referring your patient, Marcy Diaz, to the [facility]. Please see a copy of my visit note below.    [facility] Dermatology Note  Encounter Date: Feb 9, 2022  Office Visit     Dermatology Problem List:  Last skin check 2/9/22  1. Acne, hormonal  - current tx: OCP (Ambar), spironolactone 200mg daily  - previous tx: differin 0.1% gel, BPO wash, spironolactone 100 mg daily & 150mg daily, BPO 3% wash, differin 0.1% cream, tretinoin 0.025% cream, clindamycin 1%   2. LSC - posterior scalp - OTC hematocrit 1% cream prn  ____________________________________________    Assessment & Plan:    # Solar lentigines.  - Recommend sunscreens SPF #30 or greater, protective clothing and avoidance of tanning beds.  - No further intervention needed.    # Cherry angiomas.  - No further intervention needed.    # Multiple clinically benign nevi on the trunk and extremities.  - No further intervention needed.    # Acne vulgaris. Chronic, stable.  - Continue spironolactone 200mg daily. Patient has tried decreasing dosage in the past with breakthrough acne. Patient does not want to decrease dosage at this time. Menses is well controlled at this dose. Denies menstrual irregularities, breast tenderness, dizziness/lightheadedness, heart palpitations, or excessive urination. Currently on continuous Ambar OCP.   - Continue Ambar OCP. Managed by PCP. Reviewed I would be happy to supply a short refill if needed.  - Okay to use differin for flares. Holding right now due to dryness    # mild LSC, temporal and posterior scalp.  - OTC hydrocortisone, as needed. Patient declined prescription steroid at this time and will try to stop touching and scratching as she thinks this is behavioral habit to touch and scratch them rather than them being itchy.     Procedures Performed:    None.    Follow-up: 1 year for refills, sooner for concerns.    Staff and Scribe:     Scribe Disclosure:   I, Anahi Del Castillo, am serving as a scribe to document services personally performed by Mihaela John PA-C, based on data collection and the provider's statements to me.    Provider Disclosure:   The documentation recorded by the scribe accurately reflects the services I personally performed and the decisions made by me.    All risks, benefits and alternatives were discussed with patient.  Patient is in agreement and understands the assessment and plan.  All questions were answered.    Mihaela John PA-C, [facility]  [facility]: Phone: [phone], Fax: [phone]  [facility]: Phone: [phone],  Fax: [phone]  [facility]: Phone: [phone], Fax: [phone]  ____________________________________________    CC: Skin Check (no personal hx of skin cancer- father had possible bcc/ refill janeth for acne)    HPI:  Ms. Marcy Diaz is a(n) 36 year old female who presents today as a return patient for skin check and acne.    Last seen 12/21/2020 virtually. Plan for acne was to continue spironolactone 200mg daily and differin 0.1% gel nightly.    Today, Marcy notes for acne she is taking the Ambar birth control and spironolactone. Not using any topicals. She is concerned about the back, as it is difficult to see.    Denies menstrual irregularities, breast tenderness, or excessive urination. Currently on Ambar OCP.    No family history of melanoma. Father may have history of a BCC.    Patient is otherwise feeling well, without additional skin concerns.    Labs Reviewed:  N/A    Physical Exam:  Vitals: /72   Pulse 82   SKIN: Total skin excluding the undergarment areas was performed. The exam included the head/face, neck, both arms, chest, back, abdomen, both legs, digits and/or nails.  -  Huff Type II  - Scattered brown macules on the shoulders.  - There are dome shaped bright red papules on the scalp.   - Multiple regular brown pigmented macules and papules are identified on the trunk and extremities. Less than 100 on exam.  - multiple Red annular thickened plaques on the temporal and posterior scalp.  - Face is clear, no acneiform lesions  - No other lesions of concern on areas examined.     Medications:  Current Outpatient Medications   Medication     drospirenone-ethinyl estradiol (LAUREN) 3-0.02 MG tablet     escitalopram (LEXAPRO) 10 MG tablet     spironolactone (ALDACTONE) 100 MG tablet     loratadine (CLARITIN) 10 MG tablet     No current facility-administered medications for this visit.      Past Medical History:   Patient Active Problem List   Diagnosis     Acne     CARDIOVASCULAR SCREENING; LDL GOAL LESS THAN 160     Alternating exotropia     Hypertropia of right eye     Subclinical hypothyroidism     Past Medical History:   Diagnosis Date     Acne      ASCUS favor benign 2013    neg HPV; resolved     Depression with anxiety 2005     Exotropia             Again, thank you for allowing me to participate in the care of your patient.        Sincerely,        Mihaela John PA-C

## 2022-02-09 NOTE — PROGRESS NOTES
[facility] Dermatology Note  Encounter Date: Feb 9, 2022  Office Visit     Dermatology Problem List:  Last skin check 2/9/22  1. Acne, hormonal  - current tx: OCP (Ambar), spironolactone 200mg daily  - previous tx: differin 0.1% gel, BPO wash, spironolactone 100 mg daily & 150mg daily, BPO 3% wash, differin 0.1% cream, tretinoin 0.025% cream, clindamycin 1%   2. LSC - posterior scalp - OTC hematocrit 1% cream prn  ____________________________________________    Assessment & Plan:    # Solar lentigines.  - Recommend sunscreens SPF #30 or greater, protective clothing and avoidance of tanning beds.  - No further intervention needed.    # Cherry angiomas.  - No further intervention needed.    # Multiple clinically benign nevi on the trunk and extremities.  - No further intervention needed.    # Acne vulgaris. Chronic, stable.  - Continue spironolactone 200mg daily. Patient has tried decreasing dosage in the past with breakthrough acne. Patient does not want to decrease dosage at this time. Menses is well controlled at this dose. Denies menstrual irregularities, breast tenderness, dizziness/lightheadedness, heart palpitations, or excessive urination. Currently on continuous Ambar OCP.   - Continue Ambar OCP. Managed by PCP. Reviewed I would be happy to supply a short refill if needed.  - Okay to use differin for flares. Holding right now due to dryness    # mild LSC, temporal and posterior scalp.  - OTC hydrocortisone, as needed. Patient declined prescription steroid at this time and will try to stop touching and scratching as she thinks this is behavioral habit to touch and scratch them rather than them being itchy.     Procedures Performed:   None.    Follow-up: 1 year for refills, sooner for concerns.    Staff and Scribe:     Scribe Disclosure:   I, Anahi Del Castillo, am serving as a scribe to document services personally performed by Mihaela John PA-C, based on data collection and the provider's  statements to me.    Provider Disclosure:   The documentation recorded by the scribe accurately reflects the services I personally performed and the decisions made by me.    All risks, benefits and alternatives were discussed with patient.  Patient is in agreement and understands the assessment and plan.  All questions were answered.    Mihaela John PA-C, MPAS  [facility]: Phone: [phone], Fax: [phone]  [facility]: Phone: [phone],  Fax: [phone]  [facility]: Phone: [phone], Fax: [phone]  ____________________________________________    CC: Skin Check (no personal hx of skin cancer- father had possible bcc/ refill janeth for acne)    HPI:  Ms. Marcy Diaz is a(n) 36 year old female who presents today as a return patient for skin check and acne.    Last seen 12/21/2020 virtually. Plan for acne was to continue spironolactone 200mg daily and differin 0.1% gel nightly.    Today, Marcy notes for acne she is taking the Ambar birth control and spironolactone. Not using any topicals. She is concerned about the back, as it is difficult to see.    Denies menstrual irregularities, breast tenderness, or excessive urination. Currently on Ambar OCP.    No family history of melanoma. Father may have history of a BCC.    Patient is otherwise feeling well, without additional skin concerns.    Labs Reviewed:  N/A    Physical Exam:  Vitals: /72   Pulse 82   SKIN: Total skin excluding the undergarment areas was performed. The exam included the head/face, neck, both arms, chest, back, abdomen, both legs, digits and/or nails.  - Huff Type II  - Scattered brown macules on the shoulders.  - There are dome shaped bright red papules on the scalp.   - Multiple regular brown pigmented macules and papules are identified on the trunk and extremities. Less than 100 on exam.  - multiple Red  annular thickened plaques on the temporal and posterior scalp.  - Face is clear, no acneiform lesions  - No other lesions of concern on areas examined.     Medications:  Current Outpatient Medications   Medication     drospirenone-ethinyl estradiol (LAUREN) 3-0.02 MG tablet     escitalopram (LEXAPRO) 10 MG tablet     spironolactone (ALDACTONE) 100 MG tablet     loratadine (CLARITIN) 10 MG tablet     No current facility-administered medications for this visit.      Past Medical History:   Patient Active Problem List   Diagnosis     Acne     CARDIOVASCULAR SCREENING; LDL GOAL LESS THAN 160     Alternating exotropia     Hypertropia of right eye     Subclinical hypothyroidism     Past Medical History:   Diagnosis Date     Acne      ASCUS favor benign 2013    neg HPV; resolved     Depression with anxiety 2005     Exotropia

## 2022-02-09 NOTE — NURSING NOTE
Marcy Diaz's goals for this visit include:   Chief Complaint   Patient presents with     Skin Check     no personal hx of skin cancer- father had possible bcc/ refill janeth for acne     She requests these members of her care team be copied on today's visit information:     PCP: Melly Christianson    Referring Provider:  Referred Self, MD  No address on file    /72   Pulse 82     Do you need any medication refills at today's visit? No  Paris Grant, CMA on 2/9/2022 at 8:48 AM

## 2022-04-26 DIAGNOSIS — L70.0 ACNE VULGARIS: ICD-10-CM

## 2022-04-28 RX ORDER — DROSPIRENONE AND ETHINYL ESTRADIOL 0.02-3(28)
1 KIT ORAL DAILY
Qty: 84 TABLET | Refills: 0 | Status: SHIPPED | OUTPATIENT
Start: 2022-04-28 | End: 2022-09-02

## 2022-05-15 ENCOUNTER — HEALTH MAINTENANCE LETTER (OUTPATIENT)
Age: 37
End: 2022-05-15

## 2022-05-20 ENCOUNTER — MYC MEDICAL ADVICE (OUTPATIENT)
Dept: FAMILY MEDICINE | Facility: CLINIC | Age: 37
End: 2022-05-20
Payer: COMMERCIAL

## 2022-05-20 NOTE — TELEPHONE ENCOUNTER
Patient Quality Outreach    Patient is due for the following:   Depression  -  PHQ-9 Needed due by 6/15/22  Physical  - due now    NEXT STEPS:   Schedule a yearly physical    Type of outreach:    Sent [employer] message.      Questions for provider review:    None     Ami Houston [facility]  Chart routed to Care Team.

## 2022-06-01 NOTE — TELEPHONE ENCOUNTER
Spoke with pt and she is scheduled for physical and med check on 6/15/22.    Ami Houston CMA ([facility])

## 2022-09-02 ENCOUNTER — OFFICE VISIT (OUTPATIENT)
Dept: FAMILY MEDICINE | Facility: CLINIC | Age: 37
End: 2022-09-02
Payer: COMMERCIAL

## 2022-09-02 VITALS
TEMPERATURE: 98.8 F | SYSTOLIC BLOOD PRESSURE: 101 MMHG | RESPIRATION RATE: 19 BRPM | WEIGHT: 118.8 LBS | OXYGEN SATURATION: 99 % | BODY MASS INDEX: 19.09 KG/M2 | HEART RATE: 74 BPM | DIASTOLIC BLOOD PRESSURE: 66 MMHG | HEIGHT: 66 IN

## 2022-09-02 DIAGNOSIS — E03.8 SUBCLINICAL HYPOTHYROIDISM: ICD-10-CM

## 2022-09-02 DIAGNOSIS — L70.0 ACNE VULGARIS: ICD-10-CM

## 2022-09-02 DIAGNOSIS — Z00.00 ROUTINE GENERAL MEDICAL EXAMINATION AT A HEALTH CARE FACILITY: Primary | ICD-10-CM

## 2022-09-02 DIAGNOSIS — Z13.220 LIPID SCREENING: ICD-10-CM

## 2022-09-02 DIAGNOSIS — Z13.29 SCREENING FOR ENDOCRINE DISORDER: ICD-10-CM

## 2022-09-02 DIAGNOSIS — F34.1 DYSTHYMIA: ICD-10-CM

## 2022-09-02 LAB
CHOLEST SERPL-MCNC: 185 MG/DL
FASTING STATUS PATIENT QL REPORTED: YES
HBA1C MFR BLD: 5 % (ref 0–5.6)
HDLC SERPL-MCNC: 63 MG/DL
LDLC SERPL CALC-MCNC: 105 MG/DL
NONHDLC SERPL-MCNC: 122 MG/DL
TRIGL SERPL-MCNC: 83 MG/DL
TSH SERPL DL<=0.005 MIU/L-ACNC: 3.32 MU/L (ref 0.4–4)

## 2022-09-02 PROCEDURE — 99395 PREV VISIT EST AGE 18-39: CPT | Mod: 25 | Performed by: FAMILY MEDICINE

## 2022-09-02 PROCEDURE — 84443 ASSAY THYROID STIM HORMONE: CPT | Performed by: FAMILY MEDICINE

## 2022-09-02 PROCEDURE — 83036 HEMOGLOBIN GLYCOSYLATED A1C: CPT | Performed by: FAMILY MEDICINE

## 2022-09-02 PROCEDURE — 80061 LIPID PANEL: CPT | Performed by: FAMILY MEDICINE

## 2022-09-02 PROCEDURE — 90471 IMMUNIZATION ADMIN: CPT | Performed by: FAMILY MEDICINE

## 2022-09-02 PROCEDURE — 36415 COLL VENOUS BLD VENIPUNCTURE: CPT | Performed by: FAMILY MEDICINE

## 2022-09-02 PROCEDURE — 90686 IIV4 VACC NO PRSV 0.5 ML IM: CPT | Performed by: FAMILY MEDICINE

## 2022-09-02 RX ORDER — ESCITALOPRAM OXALATE 10 MG/1
10 TABLET ORAL DAILY
Qty: 90 TABLET | Refills: 3 | Status: SHIPPED | OUTPATIENT
Start: 2022-09-02 | End: 2022-09-02

## 2022-09-02 RX ORDER — DROSPIRENONE AND ETHINYL ESTRADIOL 0.02-3(28)
1 KIT ORAL DAILY
Qty: 84 TABLET | Refills: 0 | Status: SHIPPED | OUTPATIENT
Start: 2022-09-02 | End: 2022-12-01

## 2022-09-02 RX ORDER — ESCITALOPRAM OXALATE 10 MG/1
10 TABLET ORAL DAILY
Qty: 90 TABLET | Refills: 3 | Status: SHIPPED | OUTPATIENT
Start: 2022-09-02 | End: 2023-10-03

## 2022-09-02 ASSESSMENT — ENCOUNTER SYMPTOMS
ARTHRALGIAS: 0
HEADACHES: 0
EYE PAIN: 0
BREAST MASS: 0
PARESTHESIAS: 0
DIARRHEA: 0
JOINT SWELLING: 0
SHORTNESS OF BREATH: 0
NAUSEA: 0
HEMATOCHEZIA: 0
ABDOMINAL PAIN: 0
MYALGIAS: 0
HEMATURIA: 0
WEAKNESS: 0
NERVOUS/ANXIOUS: 0
DYSURIA: 0
FEVER: 0
PALPITATIONS: 0
COUGH: 0
FREQUENCY: 0
CHILLS: 0
SORE THROAT: 0
DIZZINESS: 0
HEARTBURN: 0
CONSTIPATION: 0

## 2022-09-02 ASSESSMENT — PATIENT HEALTH QUESTIONNAIRE - PHQ9
SUM OF ALL RESPONSES TO PHQ QUESTIONS 1-9: 2
SUM OF ALL RESPONSES TO PHQ QUESTIONS 1-9: 2
10. IF YOU CHECKED OFF ANY PROBLEMS, HOW DIFFICULT HAVE THESE PROBLEMS MADE IT FOR YOU TO DO YOUR WORK, TAKE CARE OF THINGS AT HOME, OR GET ALONG WITH OTHER PEOPLE: NOT DIFFICULT AT ALL

## 2022-09-02 ASSESSMENT — PAIN SCALES - GENERAL: PAINLEVEL: NO PAIN (0)

## 2022-09-02 NOTE — PROGRESS NOTES
SUBJECTIVE:   CC: Marcy Diaz is an 37 year old woman who presents for preventive health visit.       Patient has been advised of split billing requirements and indicates understanding: Yes  Healthy Habits:     Getting at least 3 servings of Calcium per day:  NO    Bi-annual eye exam:  Yes    Dental care twice a year:  Yes    Sleep apnea or symptoms of sleep apnea:  None    Diet:  Regular (no restrictions)    Frequency of exercise:  None    Taking medications regularly:  Yes    Medication side effects:  None    PHQ-2 Total Score: 0    Additional concerns today:  No        Today's PHQ-2 Score:   PHQ-2 ( 1999 [employer]) 9/2/2022   Q1: Little interest or pleasure in doing things 0   Q2: Feeling down, depressed or hopeless 0   PHQ-2 Score 0   PHQ-2 Total Score (12-17 Years)- Positive if 3 or more points; Administer PHQ-A if positive -   Q1: Little interest or pleasure in doing things Not at all   Q2: Feeling down, depressed or hopeless Not at all   PHQ-2 Score 0       Abuse: Current or Past (Physical, Sexual or Emotional) - No  Do you feel safe in your environment? Yes        Social History     Tobacco Use     Smoking status: Never Smoker     Smokeless tobacco: Never Used   Substance Use Topics     Alcohol use: Yes     Comment: once per month        Alcohol Use 9/2/2022   Prescreen: >3 drinks/day or >7 drinks/week? No   Prescreen: >3 drinks/day or >7 drinks/week? -       Reviewed orders with patient.  Reviewed health maintenance and updated orders accordingly - Yes  BP Readings from Last 3 Encounters:   09/02/22 101/66   02/09/22 106/72   05/26/21 118/74    Wt Readings from Last 3 Encounters:   09/02/22 53.9 kg (118 lb 12.8 oz)   05/26/21 52.8 kg (116 lb 6.4 oz)   04/13/21 51.3 kg (113 lb)                  Patient Active Problem List   Diagnosis     Acne     CARDIOVASCULAR SCREENING; LDL GOAL LESS THAN 160     Alternating exotropia     Hypertropia of right eye     Subclinical hypothyroidism     Past Surgical  History:   Procedure Laterality Date     EXTRACTION(S) DENTAL       EYE SURGERY  1985 & 1998    strabismus correction     RECESSION RESECTION (REPAIR STRABISMUS) Right 1/23/2020    Procedure: Right strabismus surgery (- Exploration of right inferior oblique muscle,  - Right medial rectus advancement from 14 millimeters to 10.5 millimeters from the limbus, with one-half tendon width infraplacement,  - Right lateral rectus one-half tendon width infraplacement,  - Eye muscle surgery on previously operated extraocular muscles);  Surgeon: Roxanna Diez MD;  Location: [facility] OR       Social History     Tobacco Use     Smoking status: Never Smoker     Smokeless tobacco: Never Used   Substance Use Topics     Alcohol use: Yes     Comment: once per month      Family History   Problem Relation Age of Onset     C.A.D. Maternal Grandfather      Diabetes Maternal Grandfather      Hypertension Maternal Grandfather      Prostate Cancer Maternal Grandfather      C.A.D. Paternal Grandfather      Diabetes Paternal Grandfather      Hypertension Paternal Grandfather      Hypertension Mother      Thyroid Disease Mother         hypothyroidism     Breast Cancer Paternal Grandmother      Alcohol/Drug Father         alcohol     Cancer Father         skin     Asthma No family hx of      Cerebrovascular Disease No family hx of      Cancer - colorectal No family hx of      Lipids No family hx of      Depression No family hx of          Current Outpatient Medications   Medication Sig Dispense Refill     drospirenone-ethinyl estradiol (LAUREN) 3-0.02 MG tablet Take 1 tablet by mouth daily 84 tablet 0     escitalopram (LEXAPRO) 10 MG tablet Take 1 tablet (10 mg) by mouth daily 90 tablet 3     spironolactone (ALDACTONE) 100 MG tablet TAKE 2 TABLETS(200 MG) BY MOUTH DAILY 180 tablet 3     No Known Allergies  Recent Labs   Lab Test 09/02/22  1022 10/05/21  1705 04/13/21  1122 01/06/20  1717   A1C 5.0  --   --   --    *  --  102*  --    HDL 63  --  67   --    TRIG 83  --  188*  --    CR  --   --   --  0.66   GFRESTIMATED  --   --   --  >90   GFRESTBLACK  --   --   --  >90   POTASSIUM  --   --   --  4.1   TSH 3.32 6.71* 5.96*  --         Breast Cancer Screening:    FHS-7:   Breast CA Risk Assessment (FHS-7) 4/13/2021 9/2/2022   Did any of your first-degree relatives have breast or ovarian cancer? No No   Did any of your relatives have bilateral breast cancer? No No   Did any man in your family have breast cancer? No No   Did any woman in your family have breast and ovarian cancer? Yes Yes   Did any woman in your family have breast cancer before age 50 y? No Yes   Do you have 2 or more relatives with breast and/or ovarian cancer? Yes Yes   Do you have 2 or more relatives with breast and/or bowel cancer? No Yes     click delete button to remove this line now  Patient under 40 years of age: Routine Mammogram Screening not recommended.   Pertinent mammograms are reviewed under the imaging tab.    History of abnormal Pap smear: NO - age 30- 65 PAP every 3 years recommended  NO - age 30-65 PAP every 5 years with negative HPV co-testing recommended  PAP / HPV Latest Ref Rng & Units 4/13/2021 12/11/2015 7/23/2013   PAP (Historical) - NIL NIL ASC-US(A)   HPV16 NEG:Negative Negative Negative -   HPV18 NEG:Negative Negative Negative -   HRHPV NEG:Negative Negative Negative -     Reviewed and updated as needed this visit by clinical staff   Tobacco  Allergies  Meds  Problems  Med Hx  Surg Hx  Fam Hx  Soc   Hx          Reviewed and updated as needed this visit by Provider   Tobacco   Meds  Problems  Med Hx  Surg Hx  Fam Hx  Soc Hx             Review of Systems   Constitutional: Negative for chills and fever.   HENT: Negative for congestion, ear pain, hearing loss and sore throat.    Eyes: Negative for pain and visual disturbance.   Respiratory: Negative for cough and shortness of breath.    Cardiovascular: Negative for chest pain, palpitations and peripheral edema.  "  Gastrointestinal: Negative for abdominal pain, constipation, diarrhea, heartburn, hematochezia and nausea.   Breasts:  Negative for tenderness, breast mass and discharge.   Genitourinary: Negative for dysuria, frequency, genital sores, hematuria, pelvic pain, urgency, vaginal bleeding and vaginal discharge.   Musculoskeletal: Negative for arthralgias, joint swelling and myalgias.   Skin: Negative for rash.   Neurological: Negative for dizziness, weakness, headaches and paresthesias.   Psychiatric/Behavioral: Negative for mood changes. The patient is not nervous/anxious.           OBJECTIVE:   /66   Pulse 74   Temp 98.8  F (37.1  C) (Temporal)   Resp 19   Ht 1.685 m (5' 6.34\")   Wt 53.9 kg (118 lb 12.8 oz)   SpO2 99%   BMI 18.98 kg/m    Physical Exam  GENERAL: healthy, alert and no distress  EYES: Eyes grossly normal to inspection, PERRL and conjunctivae and sclerae normal  HENT: ear canals and TM's normal, nose and mouth without ulcers or lesions  NECK: no adenopathy, no asymmetry, masses, or scars and thyroid normal to palpation  RESP: lungs clear to auscultation - no rales, rhonchi or wheezes  BREAST: normal without masses, tenderness or nipple discharge and no palpable axillary masses or adenopathy  CV: regular rate and rhythm, normal S1 S2, no S3 or S4, no murmur, click or rub, no peripheral edema and peripheral pulses strong  ABDOMEN: soft, nontender, no hepatosplenomegaly, no masses and bowel sounds normal  MS: no gross musculoskeletal defects noted, no edema  SKIN: no suspicious lesions or rashes  NEURO: Normal strength and tone, mentation intact and speech normal  PSYCH: mentation appears normal, affect normal/bright        ASSESSMENT/PLAN:   Marcy was seen today for physical.    Diagnoses and all orders for this visit:    Routine general medical examination at a health care facility    Acne vulgaris  -     drospirenone-ethinyl estradiol (LAUREN) 3-0.02 MG tablet; Take 1 tablet by mouth " "daily    Dysthymia  -     Discontinue: escitalopram (LEXAPRO) 10 MG tablet; Take 1 tablet (10 mg) by mouth daily  -     Discontinue: escitalopram (LEXAPRO) 10 MG tablet; Take 1 tablet (10 mg) by mouth daily  -     escitalopram (LEXAPRO) 10 MG tablet; Take 1 tablet (10 mg) by mouth daily    Subclinical hypothyroidism  -     TSH with free T4 reflex    Lipid screening  -     Lipid panel reflex to direct LDL Non-fasting    Screening for endocrine disorder  -     Hemoglobin A1c    Other orders  -     REVIEW OF HEALTH MAINTENANCE PROTOCOL ORDERS  -     INFLUENZA VACCINE IM > 6 MONTHS VALENT IIV4 (AFLURIA/FLUZONE)            COUNSELING:  Reviewed preventive health counseling, as reflected in patient instructions       Regular exercise       Healthy diet/nutrition       Vision screening       Hearing screening       Contraception       Osteoporosis prevention/bone health       Colorectal Cancer Screening       Consider Hep C screening for all patients one time for ages 18-79 years       Advance Care Planning    Estimated body mass index is 18.98 kg/m  as calculated from the following:    Height as of this encounter: 1.685 m (5' 6.34\").    Weight as of this encounter: 53.9 kg (118 lb 12.8 oz).        She reports that she has never smoked. She has never used smokeless tobacco.      Counseling Resources:  ATP IV Guidelines  Pooled Cohorts Equation Calculator  Breast Cancer Risk Calculator  BRCA-Related Cancer Risk Assessment: FHS-7 Tool  FRAX Risk Assessment  ICSI Preventive Guidelines  Dietary Guidelines for Americans, 2010  USDA's MyPlate  ASA Prophylaxis  Lung CA Screening    Melly Christianson MD  [facility]  Answers for HPI/ROS submitted by the patient on 9/2/2022  If you checked off any problems, how difficult have these problems made it for you to do your work, take care of things at home, or get along with other people?: Not difficult at all  PHQ9 TOTAL SCORE: 2      "

## 2022-11-29 DIAGNOSIS — L70.0 ACNE VULGARIS: ICD-10-CM

## 2022-12-01 RX ORDER — DROSPIRENONE AND ETHINYL ESTRADIOL 0.02-3(28)
1 KIT ORAL DAILY
Qty: 84 TABLET | Refills: 1 | Status: SHIPPED | OUTPATIENT
Start: 2022-12-01 | End: 2023-05-12

## 2022-12-01 NOTE — TELEPHONE ENCOUNTER
Prescription approved per [facility] Refill Protocol.  Rossy Bowling RN on 12/1/2022 at 3:17 PM

## 2022-12-26 DIAGNOSIS — F34.1 DYSTHYMIA: ICD-10-CM

## 2022-12-26 RX ORDER — ESCITALOPRAM OXALATE 10 MG/1
TABLET ORAL
Qty: 90 TABLET | Refills: 3 | OUTPATIENT
Start: 2022-12-26

## 2023-02-23 DIAGNOSIS — L70.0 ACNE VULGARIS: ICD-10-CM

## 2023-02-23 NOTE — TELEPHONE ENCOUNTER
Medication: spironolactone 100 mg tablet  Request from: fax (fax, call, my chart, other)     Pharmacy: [facility]     If from fax:  Last date filled: not provided  QTY: 90 day supply     Date fax was received: 2/23/23

## 2023-02-27 RX ORDER — SPIRONOLACTONE 100 MG/1
200 TABLET, FILM COATED ORAL DAILY
Qty: 120 TABLET | Refills: 0 | Status: SHIPPED | OUTPATIENT
Start: 2023-02-27 | End: 2023-04-26

## 2023-02-27 NOTE — TELEPHONE ENCOUNTER
spironolactone (ALDACTONE) 100 MG tablet      Last Written Prescription Date:  2/9/2022  Last Fill Quantity: 180,   # refills: 3  Last Office Visit : 2/9/2022  [facility]  Future Office visit:  none    Routing refill request to provider for review/approval because:  Failed medication protocol:  Labs and appointment  - >12 months last Creatinine, Potassium, Sodium labs  - 12 months last visit  - plan last visit 2/9/2022: # Acne vulgaris. Chronic, stable - Continue spironolactone 200mg daily and follow up in 1 year.  - message was sent to [facility] scheduling

## 2023-02-27 NOTE — TELEPHONE ENCOUNTER
2/27 Called patient and left voicemail. Provided patient with [phone] to schedule with Mihaela John PA-C for a follow up visit.     Gracy watts Procedure   Dermatology, Surgery, Urology  [facility]

## 2023-02-28 DIAGNOSIS — L70.0 ACNE VULGARIS: ICD-10-CM

## 2023-03-03 RX ORDER — SPIRONOLACTONE 100 MG/1
TABLET, FILM COATED ORAL
Qty: 180 TABLET | OUTPATIENT
Start: 2023-03-03

## 2023-04-22 DIAGNOSIS — L70.0 ACNE VULGARIS: ICD-10-CM

## 2023-04-26 RX ORDER — SPIRONOLACTONE 100 MG/1
200 TABLET, FILM COATED ORAL DAILY
Qty: 60 TABLET | Refills: 1 | Status: SHIPPED | OUTPATIENT
Start: 2023-04-26 | End: 2023-07-11

## 2023-04-26 NOTE — TELEPHONE ENCOUNTER
spironolactone (ALDACTONE) 100 MG tablet      Last Written Prescription Date:  2/27/2023  Last Fill Quantity: 120,   # refills: 0  Last Office Visit : 2/9/2022 [facility]  Future Office visit:  10/20/2023    Routing refill request to provider for review/approval because:  Failed medication protocol: labs due and appointment  - >12 months last Creatinine, Potassium, Sodium  - >12 months last seen  - plan last visit 2/9/2022: # Acne vulgaris - Continue spironolactone 200mg daily  - follow up: 1 year for refills  - future visit: 10/20/2023    Creatinine   Date Value Ref Range Status   01/06/2020 0.66 0.52 - 1.04 mg/dL Final     Potassium   Date Value Ref Range Status   01/06/2020 4.1 3.4 - 5.3 mmol/L Final     Sodium   Date Value Ref Range Status   01/06/2020 138 133 - 144 mmol/L Final

## 2023-05-12 DIAGNOSIS — L70.0 ACNE VULGARIS: ICD-10-CM

## 2023-05-12 RX ORDER — DROSPIRENONE AND ETHINYL ESTRADIOL 0.02-3(28)
1 KIT ORAL DAILY
Qty: 84 TABLET | Refills: 0 | Status: SHIPPED | OUTPATIENT
Start: 2023-05-12 | End: 2023-07-28

## 2023-07-05 ENCOUNTER — MYC MEDICAL ADVICE (OUTPATIENT)
Dept: DERMATOLOGY | Facility: CLINIC | Age: 38
End: 2023-07-05
Payer: COMMERCIAL

## 2023-07-06 DIAGNOSIS — L70.0 ACNE VULGARIS: ICD-10-CM

## 2023-07-11 NOTE — TELEPHONE ENCOUNTER
spironolactone (ALDACTONE) 100 MG tablet   Last Written Prescription Date:  4/26/23  Last Fill Quantity: 60,   # refills: 1  Last Office Visit : 2/9/22  Future Office visit:  12/15/23       Routing refill request to provider for review/approval because:   Failed medication protocol: labs due and appointment   - >12 months last Creatinine, Potassium, Sodium ( 1/6/2020)  - >12 months last seen @[facility]   - plan last visit 2/9/2022: # Acne vulgaris - Continue spironolactone 200mg daily   - follow up: 1 year for refills   - future visit: 12/15/23( clinic cancelled 10/20/23)  Pended to next appt

## 2023-07-12 RX ORDER — SPIRONOLACTONE 100 MG/1
200 TABLET, FILM COATED ORAL DAILY
Qty: 60 TABLET | Refills: 5 | Status: SHIPPED | OUTPATIENT
Start: 2023-07-12 | End: 2023-12-15

## 2023-07-28 DIAGNOSIS — L70.0 ACNE VULGARIS: ICD-10-CM

## 2023-07-28 RX ORDER — DROSPIRENONE AND ETHINYL ESTRADIOL 0.02-3(28)
1 KIT ORAL DAILY
Qty: 84 TABLET | Refills: 0 | Status: SHIPPED | OUTPATIENT
Start: 2023-07-28 | End: 2023-10-29

## 2023-08-03 ENCOUNTER — PATIENT OUTREACH (OUTPATIENT)
Dept: CARE COORDINATION | Facility: CLINIC | Age: 38
End: 2023-08-03
Payer: COMMERCIAL

## 2023-08-17 ENCOUNTER — PATIENT OUTREACH (OUTPATIENT)
Dept: CARE COORDINATION | Facility: CLINIC | Age: 38
End: 2023-08-17
Payer: COMMERCIAL

## 2023-10-03 DIAGNOSIS — F34.1 DYSTHYMIA: ICD-10-CM

## 2023-10-03 RX ORDER — ESCITALOPRAM OXALATE 10 MG/1
10 TABLET ORAL DAILY
Qty: 90 TABLET | Refills: 0 | Status: SHIPPED | OUTPATIENT
Start: 2023-10-03 | End: 2023-12-26

## 2023-10-29 ENCOUNTER — MYC REFILL (OUTPATIENT)
Dept: FAMILY MEDICINE | Facility: CLINIC | Age: 38
End: 2023-10-29
Payer: COMMERCIAL

## 2023-10-29 DIAGNOSIS — L70.0 ACNE VULGARIS: ICD-10-CM

## 2023-10-30 RX ORDER — DROSPIRENONE AND ETHINYL ESTRADIOL 0.02-3(28)
1 KIT ORAL DAILY
Qty: 84 TABLET | Refills: 0 | Status: SHIPPED | OUTPATIENT
Start: 2023-10-30 | End: 2024-01-12

## 2023-12-10 ENCOUNTER — HEALTH MAINTENANCE LETTER (OUTPATIENT)
Age: 38
End: 2023-12-10

## 2023-12-15 ENCOUNTER — OFFICE VISIT (OUTPATIENT)
Dept: DERMATOLOGY | Facility: CLINIC | Age: 38
End: 2023-12-15
Payer: COMMERCIAL

## 2023-12-15 DIAGNOSIS — Z51.81 MEDICATION MONITORING ENCOUNTER: ICD-10-CM

## 2023-12-15 DIAGNOSIS — L28.0 LSC (LICHEN SIMPLEX CHRONICUS): Primary | ICD-10-CM

## 2023-12-15 DIAGNOSIS — L70.0 ACNE VULGARIS: ICD-10-CM

## 2023-12-15 PROCEDURE — 99214 OFFICE O/P EST MOD 30 MIN: CPT | Performed by: PHYSICIAN ASSISTANT

## 2023-12-15 RX ORDER — SPIRONOLACTONE 100 MG/1
200 TABLET, FILM COATED ORAL DAILY
Qty: 180 TABLET | Refills: 3 | Status: SHIPPED | OUTPATIENT
Start: 2023-12-15

## 2023-12-15 RX ORDER — FLUOCINONIDE TOPICAL SOLUTION USP, 0.05% 0.5 MG/ML
SOLUTION TOPICAL 2 TIMES DAILY
Qty: 60 ML | Refills: 3 | Status: SHIPPED | OUTPATIENT
Start: 2023-12-15

## 2023-12-15 ASSESSMENT — PAIN SCALES - GENERAL: PAINLEVEL: NO PAIN (0)

## 2023-12-15 NOTE — LETTER
12/15/2023       RE: Marcy Diaz  [address]  [place] [address]     Dear Colleague,    Thank you for referring your patient, Marcy Diaz, to the [facility]ERMAT[facility] [place] at [facility]. Please see a copy of my visit note below.    [facility] Dermatology Note  Encounter Date: Dec 15, 2023  Office Visit      Dermatology Problem List:  1. Acne, hormonal  - current tx: OCP (Ambar), spironolactone 200mg daily  - previous tx: differin 0.1% gel, BPO wash, spironolactone 100 mg daily & 150mg daily, BPO 3% wash, differin 0.1% cream, tretinoin 0.025% cream, clindamycin 1%   - Labs ordered on 12/15/23: Potassium, creatinine, and BUN: pending results.   2. LSC - Occipital scalp - Tx: Fluocinonide solution  ____________________________________________    Assessment & Plan:  # LSC, Occipital scalp  - Last visit I recommended OTC hydrocortisone, as needed. Patient declined prescription steroid at this time and will try to stop touching and scratching as she thinks this is behavioral habit to touch and scratch them rather than them being itchy.   - On exam today she is noted to have red scaly patches, She is requesting something since hydrocortisone is not giving much relief.   - Start on Fluocinonide solution. To apply to her scalp as needed.  Steroid edu given    # Acne vulgaris. Chronic, stable.  - Continue spironolactone 200mg daily. Patient has tried decreasing dosage in the past with breakthrough acne. Patient does not want to decrease dosage at this time. Menses is well controlled at this dose. Denies menstrual irregularities, breast tenderness, dizziness/lightheadedness, heart palpitations, or excessive urination. Refilled Rx.   - Currently on continuous Ambar OCP.  Continue Ambar OCP. Managed by PCP. Reviewed I would be happy to supply a short refill if needed.  - Labs ordered: Potassium, creatinine, and BUN ordered.      Procedures Performed:   none    Follow-up: 1 year(s) in-person, or earlier for new or changing lesions    Staff and scribe     Scribe Disclosure:   I, CRIS RODGERS, am serving as a scribe; to document services personally performed by Mihaela John PA-C -based on data collection and the provider's statements to me.     Provider Disclosure:  I agree with above History, Review of Systems, Physical exam and Plan.  I have reviewed the content of the documentation and have edited it as needed. I have personally performed the services documented here and the documentation accurately represents those services and the decisions I have made.      Electronically signed by:    All risks, benefits and alternatives were discussed with patient.  Patient is in agreement and understands the assessment and plan.  All questions were answered.    Mihaela John PA-C, [facility]  [facility] [place]: Phone: [phone], Fax: [phone]  [facility]: Phone: [phone],  Fax: [phone]  [facility]: Phone: [phone], Fax: [phone]  ____________________________________________    CC: Derm Problem (Acne- acne is okay, has been scratching the back of head, talked about last time but didn't do anything, needs to do something now. )      Reviewed patients past medical history and pertinent chart review prior to patient's visit today.     HPI:  Ms. Marcy Diaz is a 38 year old female who presents today as a return patient for Acne.     Patient reports that her acne has been stable. She did complain that the back of her head has been itching.     Patient is otherwise feeling well, without additional concerns.    Labs:  BUN/Cr and K+ ordered    Physical Exam:  Vitals: There were no vitals taken for this visit.  SKIN: Focused examination of face and scalp was performed.   - Red patches on the occipital scalp.    - Acne is  stable. No active lesions.   - No other lesions of concern on areas examined.     Medications:  Current Outpatient Medications   Medication    drospirenone-ethinyl estradiol (LAUREN) 3-0.02 MG tablet    escitalopram (LEXAPRO) 10 MG tablet    spironolactone (ALDACTONE) 100 MG tablet     No current facility-administered medications for this visit.      Past Medical/Surgical History:   Patient Active Problem List   Diagnosis    Acne    CARDIOVASCULAR SCREENING; LDL GOAL LESS THAN 160    Alternating exotropia    Hypertropia of right eye    Subclinical hypothyroidism     Past Medical History:   Diagnosis Date    Acne     ASCUS favor benign 2013    neg HPV; resolved    Depression with anxiety 2005    Exotropia

## 2023-12-15 NOTE — PROGRESS NOTES
[facility] Dermatology Note  Encounter Date: Dec 15, 2023  Office Visit      Dermatology Problem List:  1. Acne, hormonal  - current tx: OCP (Ambar), spironolactone 200mg daily  - previous tx: differin 0.1% gel, BPO wash, spironolactone 100 mg daily & 150mg daily, BPO 3% wash, differin 0.1% cream, tretinoin 0.025% cream, clindamycin 1%   - Labs ordered on 12/15/23: Potassium, creatinine, and BUN: pending results.   2. LSC - Occipital scalp - Tx: Fluocinonide solution  ____________________________________________    Assessment & Plan:  # LSC, Occipital scalp  - Last visit I recommended OTC hydrocortisone, as needed. Patient declined prescription steroid at this time and will try to stop touching and scratching as she thinks this is behavioral habit to touch and scratch them rather than them being itchy.   - On exam today she is noted to have red scaly patches, She is requesting something since hydrocortisone is not giving much relief.   - Start on Fluocinonide solution. To apply to her scalp as needed.  Steroid edu given    # Acne vulgaris. Chronic, stable.  - Continue spironolactone 200mg daily. Patient has tried decreasing dosage in the past with breakthrough acne. Patient does not want to decrease dosage at this time. Menses is well controlled at this dose. Denies menstrual irregularities, breast tenderness, dizziness/lightheadedness, heart palpitations, or excessive urination. Refilled Rx.   - Currently on continuous Ambar OCP.  Continue Ambar OCP. Managed by PCP. Reviewed I would be happy to supply a short refill if needed.  - Labs ordered: Potassium, creatinine, and BUN ordered.     Procedures Performed:   none    Follow-up: 1 year(s) in-person, or earlier for new or changing lesions    Staff and scribe     Scribe Disclosure:   I, CRIS RODGERS, am serving as a scribe; to document services personally performed by Mihaela John PA-C -based on data collection and the provider's statements to me.      Provider Disclosure:  I agree with above History, Review of Systems, Physical exam and Plan.  I have reviewed the content of the documentation and have edited it as needed. I have personally performed the services documented here and the documentation accurately represents those services and the decisions I have made.      Electronically signed by:    All risks, benefits and alternatives were discussed with patient.  Patient is in agreement and understands the assessment and plan.  All questions were answered.    Mihaela John PA-C, MPAS  [facility]: Phone: [phone], Fax: [phone]  [facility]: Phone: [phone],  Fax: [phone]  [facility]: Phone: [phone], Fax: [phone]  ____________________________________________    CC: Derm Problem (Acne- acne is okay, has been scratching the back of head, talked about last time but didn't do anything, needs to do something now. )      Reviewed patients past medical history and pertinent chart review prior to patient's visit today.     HPI:  Ms. Marcy Diaz is a 38 year old female who presents today as a return patient for Acne.     Patient reports that her acne has been stable. She did complain that the back of her head has been itching.     Patient is otherwise feeling well, without additional concerns.    Labs:  BUN/Cr and K+ ordered    Physical Exam:  Vitals: There were no vitals taken for this visit.  SKIN: Focused examination of face and scalp was performed.   - Red patches on the occipital scalp.    - Acne is stable. No active lesions.   - No other lesions of concern on areas examined.     Medications:  Current Outpatient Medications   Medication    drospirenone-ethinyl estradiol (LAUREN) 3-0.02 MG tablet    escitalopram (LEXAPRO) 10 MG tablet    spironolactone (ALDACTONE) 100 MG tablet     No current facility-administered medications  for this visit.      Past Medical/Surgical History:   Patient Active Problem List   Diagnosis    Acne    CARDIOVASCULAR SCREENING; LDL GOAL LESS THAN 160    Alternating exotropia    Hypertropia of right eye    Subclinical hypothyroidism     Past Medical History:   Diagnosis Date    Acne     ASCUS favor benign 2013    neg HPV; resolved    Depression with anxiety 2005    Exotropia

## 2023-12-15 NOTE — NURSING NOTE
Dermatology Rooming Note    Marcy Diaz's goals for this visit include:   Chief Complaint   Patient presents with    Derm Problem     Acne- acne is okay, has been scratching the back of head, talked about last time but didn't do anything, needs to do something now.      Jacqueline Martinez, EMT

## 2023-12-19 ENCOUNTER — MYC MEDICAL ADVICE (OUTPATIENT)
Dept: DERMATOLOGY | Facility: CLINIC | Age: 38
End: 2023-12-19
Payer: COMMERCIAL

## 2023-12-19 ENCOUNTER — TELEPHONE (OUTPATIENT)
Dept: DERMATOLOGY | Facility: CLINIC | Age: 38
End: 2023-12-19
Payer: COMMERCIAL

## 2023-12-19 NOTE — TELEPHONE ENCOUNTER
Left Voicemail (1st Attempt) Sent MyChart for the patient to call back and schedule the following:    Appointment type: return Derm  Provider: Mihaela John  Return date: 6/2024  Specialty phone number: [phone]    Additonal Notes: Please schedule for 6m f/up in Dermatology.

## 2023-12-21 NOTE — TELEPHONE ENCOUNTER
Please call us back to simon a telephone follow up with Mihaela John at the dermatolgy department. [phone].

## 2023-12-26 DIAGNOSIS — F34.1 DYSTHYMIA: ICD-10-CM

## 2023-12-26 RX ORDER — ESCITALOPRAM OXALATE 10 MG/1
10 TABLET ORAL DAILY
Qty: 30 TABLET | Refills: 0 | Status: SHIPPED | OUTPATIENT
Start: 2023-12-26 | End: 2024-01-12

## 2023-12-28 DIAGNOSIS — L70.0 ACNE VULGARIS: ICD-10-CM

## 2024-01-03 RX ORDER — SPIRONOLACTONE 100 MG/1
200 TABLET, FILM COATED ORAL DAILY
Qty: 60 TABLET | OUTPATIENT
Start: 2024-01-03

## 2024-01-12 ENCOUNTER — OFFICE VISIT (OUTPATIENT)
Dept: FAMILY MEDICINE | Facility: CLINIC | Age: 39
End: 2024-01-12
Attending: FAMILY MEDICINE
Payer: COMMERCIAL

## 2024-01-12 VITALS
DIASTOLIC BLOOD PRESSURE: 70 MMHG | HEART RATE: 61 BPM | WEIGHT: 124.8 LBS | TEMPERATURE: 97.4 F | BODY MASS INDEX: 20.06 KG/M2 | SYSTOLIC BLOOD PRESSURE: 110 MMHG | HEIGHT: 66 IN | OXYGEN SATURATION: 100 % | RESPIRATION RATE: 16 BRPM

## 2024-01-12 DIAGNOSIS — F34.1 DYSTHYMIA: ICD-10-CM

## 2024-01-12 DIAGNOSIS — Z12.4 CERVICAL CANCER SCREENING: ICD-10-CM

## 2024-01-12 DIAGNOSIS — Z00.00 ROUTINE GENERAL MEDICAL EXAMINATION AT A HEALTH CARE FACILITY: Primary | ICD-10-CM

## 2024-01-12 DIAGNOSIS — Z51.81 MEDICATION MONITORING ENCOUNTER: ICD-10-CM

## 2024-01-12 DIAGNOSIS — Z12.31 ENCOUNTER FOR SCREENING MAMMOGRAM FOR BREAST CANCER: ICD-10-CM

## 2024-01-12 DIAGNOSIS — Z13.220 LIPID SCREENING: ICD-10-CM

## 2024-01-12 DIAGNOSIS — L70.0 ACNE VULGARIS: ICD-10-CM

## 2024-01-12 DIAGNOSIS — Z13.29 SCREENING FOR ENDOCRINE DISORDER: ICD-10-CM

## 2024-01-12 LAB
BUN SERPL-MCNC: 10.8 MG/DL (ref 6–20)
CHOLEST SERPL-MCNC: 215 MG/DL
CREAT SERPL-MCNC: 0.79 MG/DL (ref 0.51–0.95)
EGFRCR SERPLBLD CKD-EPI 2021: >90 ML/MIN/1.73M2
FASTING STATUS PATIENT QL REPORTED: YES
HBA1C MFR BLD: 5.3 % (ref 0–5.6)
HDLC SERPL-MCNC: 72 MG/DL
LDLC SERPL CALC-MCNC: 115 MG/DL
NONHDLC SERPL-MCNC: 143 MG/DL
POTASSIUM SERPL-SCNC: 3.8 MMOL/L (ref 3.4–5.3)
T4 FREE SERPL-MCNC: 1.24 NG/DL (ref 0.9–1.7)
TRIGL SERPL-MCNC: 141 MG/DL
TSH SERPL DL<=0.005 MIU/L-ACNC: 5.48 UIU/ML (ref 0.3–4.2)

## 2024-01-12 PROCEDURE — 84520 ASSAY OF UREA NITROGEN: CPT | Performed by: FAMILY MEDICINE

## 2024-01-12 PROCEDURE — 36415 COLL VENOUS BLD VENIPUNCTURE: CPT | Performed by: FAMILY MEDICINE

## 2024-01-12 PROCEDURE — 84132 ASSAY OF SERUM POTASSIUM: CPT | Performed by: FAMILY MEDICINE

## 2024-01-12 PROCEDURE — 87624 HPV HI-RISK TYP POOLED RSLT: CPT | Performed by: FAMILY MEDICINE

## 2024-01-12 PROCEDURE — 99395 PREV VISIT EST AGE 18-39: CPT | Performed by: FAMILY MEDICINE

## 2024-01-12 PROCEDURE — G0145 SCR C/V CYTO,THINLAYER,RESCR: HCPCS | Performed by: FAMILY MEDICINE

## 2024-01-12 PROCEDURE — 84443 ASSAY THYROID STIM HORMONE: CPT | Performed by: FAMILY MEDICINE

## 2024-01-12 PROCEDURE — 80061 LIPID PANEL: CPT | Performed by: FAMILY MEDICINE

## 2024-01-12 PROCEDURE — 84439 ASSAY OF FREE THYROXINE: CPT | Performed by: FAMILY MEDICINE

## 2024-01-12 PROCEDURE — 83036 HEMOGLOBIN GLYCOSYLATED A1C: CPT | Performed by: FAMILY MEDICINE

## 2024-01-12 PROCEDURE — 82565 ASSAY OF CREATININE: CPT | Performed by: FAMILY MEDICINE

## 2024-01-12 RX ORDER — ESCITALOPRAM OXALATE 10 MG/1
10 TABLET ORAL DAILY
Qty: 90 TABLET | Refills: 3 | Status: SHIPPED | OUTPATIENT
Start: 2024-01-12

## 2024-01-12 RX ORDER — DROSPIRENONE AND ETHINYL ESTRADIOL 0.02-3(28)
1 KIT ORAL DAILY
Qty: 84 TABLET | Refills: 3 | Status: SHIPPED | OUTPATIENT
Start: 2024-01-12

## 2024-01-12 ASSESSMENT — ENCOUNTER SYMPTOMS
FEVER: 0
NERVOUS/ANXIOUS: 0
DYSURIA: 0
MYALGIAS: 0
DIZZINESS: 0
SORE THROAT: 0
EYE PAIN: 0
HEARTBURN: 0
HEMATURIA: 0
WEAKNESS: 0
CHILLS: 0
CONSTIPATION: 0
PALPITATIONS: 0
DIARRHEA: 0
NAUSEA: 0
ARTHRALGIAS: 0
HEMATOCHEZIA: 0
HEADACHES: 0
BREAST MASS: 0
COUGH: 0
PARESTHESIAS: 0
JOINT SWELLING: 0
FREQUENCY: 0
SHORTNESS OF BREATH: 0
ABDOMINAL PAIN: 0

## 2024-01-12 ASSESSMENT — ANXIETY QUESTIONNAIRES
7. FEELING AFRAID AS IF SOMETHING AWFUL MIGHT HAPPEN: NOT AT ALL
6. BECOMING EASILY ANNOYED OR IRRITABLE: NOT AT ALL
8. IF YOU CHECKED OFF ANY PROBLEMS, HOW DIFFICULT HAVE THESE MADE IT FOR YOU TO DO YOUR WORK, TAKE CARE OF THINGS AT HOME, OR GET ALONG WITH OTHER PEOPLE?: SOMEWHAT DIFFICULT
1. FEELING NERVOUS, ANXIOUS, OR ON EDGE: NOT AT ALL
GAD7 TOTAL SCORE: 3
7. FEELING AFRAID AS IF SOMETHING AWFUL MIGHT HAPPEN: NOT AT ALL
IF YOU CHECKED OFF ANY PROBLEMS ON THIS QUESTIONNAIRE, HOW DIFFICULT HAVE THESE PROBLEMS MADE IT FOR YOU TO DO YOUR WORK, TAKE CARE OF THINGS AT HOME, OR GET ALONG WITH OTHER PEOPLE: SOMEWHAT DIFFICULT
2. NOT BEING ABLE TO STOP OR CONTROL WORRYING: SEVERAL DAYS
GAD7 TOTAL SCORE: 3
3. WORRYING TOO MUCH ABOUT DIFFERENT THINGS: SEVERAL DAYS
5. BEING SO RESTLESS THAT IT IS HARD TO SIT STILL: NOT AT ALL
GAD7 TOTAL SCORE: 3
4. TROUBLE RELAXING: SEVERAL DAYS

## 2024-01-12 ASSESSMENT — PAIN SCALES - GENERAL: PAINLEVEL: NO PAIN (0)

## 2024-01-12 ASSESSMENT — PATIENT HEALTH QUESTIONNAIRE - PHQ9
10. IF YOU CHECKED OFF ANY PROBLEMS, HOW DIFFICULT HAVE THESE PROBLEMS MADE IT FOR YOU TO DO YOUR WORK, TAKE CARE OF THINGS AT HOME, OR GET ALONG WITH OTHER PEOPLE: SOMEWHAT DIFFICULT
SUM OF ALL RESPONSES TO PHQ QUESTIONS 1-9: 1
SUM OF ALL RESPONSES TO PHQ QUESTIONS 1-9: 1

## 2024-01-12 NOTE — PROGRESS NOTES
SUBJECTIVE:   Marcy is a 38 year old, presenting for the following:  Physical        1/12/2024     9:29 AM   Additional Questions   Roomed by Loan POWELL CMA   Accompanied by self         1/12/2024     9:29 AM   Patient Reported Additional Medications   Patient reports taking the following new medications n/a       Healthy Habits:     Getting at least 3 servings of Calcium per day:  NO    Bi-annual eye exam:  Yes    Dental care twice a year:  Yes    Sleep apnea or symptoms of sleep apnea:  None    Diet:  Regular (no restrictions)    Frequency of exercise:  2-3 days/week    Duration of exercise:  15-30 minutes    Taking medications regularly:  Yes    Medication side effects:  None    Additional concerns today:  No    NP at the [facility]    Today's PHQ-9 Score:       1/12/2024     9:21 AM   PHQ-9 SCORE   PHQ-9 Total Score MyChart 1 (Minimal depression)   PHQ-9 Total Score 1               Social History     Tobacco Use    Smoking status: Never    Smokeless tobacco: Never   Substance Use Topics    Alcohol use: Yes     Comment: once per month              1/12/2024     9:23 AM   Alcohol Use   Prescreen: >3 drinks/day or >7 drinks/week? No          No data to display              Reviewed orders with patient.  Reviewed health maintenance and updated orders accordingly - Yes  BP Readings from Last 3 Encounters:   01/12/24 110/70   09/02/22 101/66   02/09/22 106/72    Wt Readings from Last 3 Encounters:   01/12/24 56.6 kg (124 lb 12.8 oz)   09/02/22 53.9 kg (118 lb 12.8 oz)   05/26/21 52.8 kg (116 lb 6.4 oz)                  Patient Active Problem List   Diagnosis    Acne    Alternating exotropia    Hypertropia of right eye    Subclinical hypothyroidism     Past Surgical History:   Procedure Laterality Date    EXTRACTION(S) DENTAL      EYE SURGERY  1985 & 1998    strabismus correction    RECESSION RESECTION (REPAIR STRABISMUS) Right 1/23/2020    Procedure: Right strabismus surgery (- Exploration of right inferior oblique  muscle,  - Right medial rectus advancement from 14 millimeters to 10.5 millimeters from the limbus, with one-half tendon width infraplacement,  - Right lateral rectus one-half tendon width infraplacement,  - Eye muscle surgery on previously operated extraocular muscles);  Surgeon: Roxanna Diez MD;  Location: [facility] OR       Social History     Tobacco Use    Smoking status: Never    Smokeless tobacco: Never   Substance Use Topics    Alcohol use: Yes     Comment: once per month      Family History   Problem Relation Age of Onset    Skin Cancer Mother     Hypertension Mother     Thyroid Disease Mother         hypothyroidism    Alcohol/Drug Father         alcohol    Cancer Father         skin    C.A.D. Maternal Grandfather     Diabetes Maternal Grandfather     Hypertension Maternal Grandfather     Prostate Cancer Maternal Grandfather     Breast Cancer Paternal Grandmother     C.A.D. Paternal Grandfather     Diabetes Paternal Grandfather     Hypertension Paternal Grandfather     Asthma No family hx of     Cerebrovascular Disease No family hx of     Cancer - colorectal No family hx of     Lipids No family hx of     Depression No family hx of          Current Outpatient Medications   Medication Sig Dispense Refill    drospirenone-ethinyl estradiol (LAUREN) 3-0.02 MG tablet Take 1 tablet by mouth daily 84 tablet 3    escitalopram (LEXAPRO) 10 MG tablet Take 1 tablet (10 mg) by mouth daily 90 tablet 3    fluocinonide (LIDEX) 0.05 % external solution Apply topically 2 times daily 60 mL 3    spironolactone (ALDACTONE) 100 MG tablet Take 2 tablets (200 mg) by mouth daily 180 tablet 3     No Known Allergies  Recent Labs   Lab Test 09/02/22  1022 10/05/21  1705 04/13/21  1122 01/06/20  1717   A1C 5.0  --   --   --    *  --  102*  --    HDL 63  --  67  --    TRIG 83  --  188*  --    CR  --   --   --  0.66   GFRESTIMATED  --   --   --  >90   GFRESTBLACK  --   --   --  >90   POTASSIUM  --   --   --  4.1   TSH 3.32 6.71* 5.96*  --   "       Breast Cancer Screenin/13/2021    10:20 AM 2022     9:15 AM 2024     9:23 AM   Breast CA Risk Assessment (FHS-7)   Do you have a family history of breast, colon, or ovarian cancer? Yes Yes No / Unknown       click delete button to remove this line now  Mammogram Screening - Offered annual screening and updated Health Maintenance for [place] plan based on risk factor consideration  Pertinent mammograms are reviewed under the imaging tab.    History of abnormal Pap smear: Patient reports an abnormal PAP smear more than 10 years ago. \"Unsure of results, normal after repeat\".      Latest Ref Rng & Units 2021    10:39 AM 2015     2:45 PM 2015    12:00 AM   PAP / HPV   PAP (Historical)  NIL   NIL    HPV 16 DNA NEG^Negative Negative  Negative     HPV 18 DNA NEG^Negative Negative  Negative     Other HR HPV NEG^Negative Negative  Negative       Reviewed and updated as needed this visit by clinical staff   Tobacco  Allergies  Meds              Reviewed and updated as needed this visit by Provider      Review of Systems   Constitutional:  Negative for chills and fever.   HENT:  Negative for congestion, ear pain, hearing loss and sore throat.    Eyes:  Negative for pain and visual disturbance.   Respiratory:  Negative for cough and shortness of breath.    Cardiovascular:  Negative for chest pain, palpitations and peripheral edema.   Gastrointestinal:  Negative for abdominal pain, constipation, diarrhea, heartburn, hematochezia and nausea.   Breasts:  Negative for tenderness, breast mass and discharge.   Genitourinary:  Negative for dysuria, frequency, genital sores, hematuria, pelvic pain, urgency, vaginal bleeding and vaginal discharge.   Musculoskeletal:  Negative for arthralgias, joint swelling and myalgias.   Skin:  Negative for rash.   Neurological:  Negative for dizziness, weakness, headaches and paresthesias.   Psychiatric/Behavioral:  Negative for mood changes. The patient " "is not nervous/anxious.           OBJECTIVE:   /70   Pulse 61   Temp 97.4  F (36.3  C) (Temporal)   Resp 16   Ht 1.683 m (5' 6.25\")   Wt 56.6 kg (124 lb 12.8 oz)   SpO2 100%   BMI 19.99 kg/m    Physical Exam  GENERAL: healthy, alert and no distress  EYES: Eyes grossly normal to inspection, PERRL and conjunctivae and sclerae normal  HENT: ear canals and TM's normal, nose and mouth without ulcers or lesions  NECK: no adenopathy, no asymmetry, masses, or scars and thyroid normal to palpation  RESP: lungs clear to auscultation - no rales, rhonchi or wheezes  CV: regular rate and rhythm, normal S1 S2, no S3 or S4, no murmur, click or rub, no peripheral edema and peripheral pulses strong  ABDOMEN: soft, nontender, no hepatosplenomegaly, no masses and bowel sounds normal   (female): normal female external genitalia, normal urethral meatus, vaginal mucosa, normal cervix/adnexa/uterus without masses or discharge  MS: no gross musculoskeletal defects noted, no edema  NEURO: Normal strength and tone, mentation intact and speech normal  PSYCH: mentation appears normal, affect normal/bright    Diagnostic Test Results:  Labs reviewed in Epic    ASSESSMENT/PLAN:       ICD-10-CM    1. Routine general medical examination at a health care facility  Z00.00 REVIEW OF HEALTH MAINTENANCE PROTOCOL ORDERS      2. Dysthymia  F34.1 escitalopram (LEXAPRO) 10 MG tablet      3. Acne vulgaris  L70.0 drospirenone-ethinyl estradiol (LAUREN) 3-0.02 MG tablet      4. Cervical cancer screening  Z12.4 Pap Screen with HPV - recommended age 30 - 65 years      5. Screening for endocrine disorder  Z13.29 Hemoglobin A1c     TSH with free T4 reflex      6. Lipid screening  Z13.220 Lipid panel reflex to direct LDL Fasting      7. Encounter for screening mammogram for breast cancer  Z12.31 *MA Screening Digital Bilateral          Patient has been advised of split billing requirements and indicates understanding: Yes      COUNSELING:  Reviewed " preventive health counseling, as reflected in patient instructions       Regular exercise       Healthy diet/nutrition       Vision screening       Hearing screening       Osteoporosis prevention/bone health       Colorectal Cancer Screening       Consider Hep C screening for all patients one time for ages 18-79 years       Advance Care Planning        She reports that she has never smoked. She has never used smokeless tobacco.          Melly Christianson MD  [facility]  Answers submitted by the patient for this visit:  Patient Health Questionnaire (Submitted on 1/12/2024)  If you checked off any problems, how difficult have these problems made it for you to do your work, take care of things at home, or get along with other people?: Somewhat difficult  PHQ9 TOTAL SCORE: 1  MONICA-7 (Submitted on 1/12/2024)  MONICA 7 TOTAL SCORE: 3

## 2024-01-13 DIAGNOSIS — E03.8 SUBCLINICAL HYPOTHYROIDISM: Primary | ICD-10-CM

## 2024-01-16 DIAGNOSIS — Z13.220 LIPID SCREENING: Primary | ICD-10-CM

## 2024-01-16 LAB
BKR LAB AP GYN ADEQUACY: NORMAL
BKR LAB AP GYN INTERPRETATION: NORMAL
BKR LAB AP HPV REFLEX: NORMAL
BKR LAB AP PREVIOUS ABNORMAL: NORMAL
PATH REPORT.COMMENTS IMP SPEC: NORMAL
PATH REPORT.COMMENTS IMP SPEC: NORMAL
PATH REPORT.RELEVANT HX SPEC: NORMAL

## 2024-01-17 DIAGNOSIS — L70.0 ACNE VULGARIS: ICD-10-CM

## 2024-01-17 RX ORDER — DROSPIRENONE AND ETHINYL ESTRADIOL 0.02-3(28)
1 KIT ORAL DAILY
Qty: 84 TABLET | Refills: 3 | OUTPATIENT
Start: 2024-01-17

## 2024-01-18 LAB
HUMAN PAPILLOMA VIRUS 16 DNA: NEGATIVE
HUMAN PAPILLOMA VIRUS 18 DNA: NEGATIVE
HUMAN PAPILLOMA VIRUS FINAL DIAGNOSIS: NORMAL
HUMAN PAPILLOMA VIRUS OTHER HR: NEGATIVE

## 2024-04-28 ENCOUNTER — HEALTH MAINTENANCE LETTER (OUTPATIENT)
Age: 39
End: 2024-04-28

## 2024-04-30 ENCOUNTER — ANCILLARY PROCEDURE (OUTPATIENT)
Dept: MAMMOGRAPHY | Facility: CLINIC | Age: 39
End: 2024-04-30
Attending: FAMILY MEDICINE
Payer: COMMERCIAL

## 2024-04-30 DIAGNOSIS — Z12.31 ENCOUNTER FOR SCREENING MAMMOGRAM FOR BREAST CANCER: ICD-10-CM

## 2024-04-30 PROCEDURE — 77067 SCR MAMMO BI INCL CAD: CPT | Mod: GC | Performed by: STUDENT IN AN ORGANIZED HEALTH CARE EDUCATION/TRAINING PROGRAM

## 2024-05-02 ENCOUNTER — ANCILLARY PROCEDURE (OUTPATIENT)
Dept: MAMMOGRAPHY | Facility: CLINIC | Age: 39
End: 2024-05-02
Attending: FAMILY MEDICINE
Payer: COMMERCIAL

## 2024-05-02 DIAGNOSIS — R92.8 ABNORMAL MAMMOGRAM: ICD-10-CM

## 2024-05-02 PROCEDURE — G0279 TOMOSYNTHESIS, MAMMO: HCPCS | Mod: RT | Performed by: STUDENT IN AN ORGANIZED HEALTH CARE EDUCATION/TRAINING PROGRAM

## 2024-05-02 PROCEDURE — 76642 ULTRASOUND BREAST LIMITED: CPT | Mod: RT | Performed by: STUDENT IN AN ORGANIZED HEALTH CARE EDUCATION/TRAINING PROGRAM

## 2024-05-02 PROCEDURE — 77065 DX MAMMO INCL CAD UNI: CPT | Mod: RT | Performed by: STUDENT IN AN ORGANIZED HEALTH CARE EDUCATION/TRAINING PROGRAM

## 2024-11-04 NOTE — TELEPHONE ENCOUNTER
Pending Prescriptions:                       Disp   Refills    drospirenone-ethinyl estradiol (LAUREN) 3-0.*84 tab*0            Sig: TAKE 1 TABLET BY MOUTH DAILY    Medication is being filled for 1 time malika refill only due to:  Patient is due for mood follow-up    Please call and help schedule.  Thank you!            
mychart read   
mychart sent   
inpatient rehab

## 2024-12-04 DIAGNOSIS — L70.0 ACNE VULGARIS: ICD-10-CM

## 2024-12-04 RX ORDER — DROSPIRENONE AND ETHINYL ESTRADIOL 0.02-3(28)
1 KIT ORAL DAILY
Qty: 84 TABLET | Refills: 1 | Status: SHIPPED | OUTPATIENT
Start: 2024-12-04

## 2024-12-12 NOTE — PATIENT INSTRUCTIONS
----- Message from Mary Fagan PA-C sent at 12/12/2024  8:01 AM CST -----  Let know negative results   It has been a pleasure to take care of you and I wish you all the best in the future. If you have any strabismus needs in the future please reach out any time. Continue regular eye care (exams are recommended every 1-2 years) with your local provider. If you need a care provider, I recommend Dr. Enoch Boyd or Dr. Paradise Muñiz.     Dr. Boyd: [email or web address]    Dr. Muñiz: [email or web address]    To schedule an appointment, call [phone].

## 2025-01-07 DIAGNOSIS — F34.1 DYSTHYMIA: ICD-10-CM

## 2025-01-07 RX ORDER — ESCITALOPRAM OXALATE 10 MG/1
10 TABLET ORAL DAILY
Qty: 90 TABLET | Refills: 0 | Status: SHIPPED | OUTPATIENT
Start: 2025-01-07

## 2025-01-08 DIAGNOSIS — L70.0 ACNE VULGARIS: ICD-10-CM

## 2025-01-15 ENCOUNTER — TELEPHONE (OUTPATIENT)
Dept: DERMATOLOGY | Facility: CLINIC | Age: 40
End: 2025-01-15
Payer: COMMERCIAL

## 2025-01-15 RX ORDER — SPIRONOLACTONE 100 MG/1
200 TABLET, FILM COATED ORAL DAILY
Qty: 180 TABLET | Refills: 3 | OUTPATIENT
Start: 2025-01-15

## 2025-01-15 NOTE — TELEPHONE ENCOUNTER
1/15 Left Voicemail (1st Attempt) and Sent Mychart (1st Attempt) for the patient to call back and schedule the following:    Appointment type:Return Dermatology  Provider: Alvaro  Return date: next available   Specialty phone number: [phone]  Additional appointment(s) needed: n/a  Additonal Notes: medication refill

## 2025-01-15 NOTE — TELEPHONE ENCOUNTER
LVD:  12/15/2023  [facility] Mihaela Clemente, EMILEE  Dermatology   Spironolactone denied per protocol #2. Needs visit within 6 months for refill of this medication   Scheduling has been notified to contact the pt for appointment.

## 2025-02-06 ENCOUNTER — LAB (OUTPATIENT)
Dept: LAB | Facility: CLINIC | Age: 40
End: 2025-02-06
Payer: COMMERCIAL

## 2025-02-06 DIAGNOSIS — Z13.29 SCREENING FOR ENDOCRINE DISORDER: ICD-10-CM

## 2025-02-06 DIAGNOSIS — Z13.220 LIPID SCREENING: ICD-10-CM

## 2025-02-06 LAB
CHOLEST SERPL-MCNC: 205 MG/DL
EST. AVERAGE GLUCOSE BLD GHB EST-MCNC: 105 MG/DL
FASTING STATUS PATIENT QL REPORTED: YES
HBA1C MFR BLD: 5.3 % (ref 0–5.6)
HDLC SERPL-MCNC: 78 MG/DL
LDLC SERPL CALC-MCNC: 105 MG/DL
NONHDLC SERPL-MCNC: 127 MG/DL
TRIGL SERPL-MCNC: 108 MG/DL

## 2025-02-16 ENCOUNTER — MYC REFILL (OUTPATIENT)
Dept: DERMATOLOGY | Facility: CLINIC | Age: 40
End: 2025-02-16
Payer: COMMERCIAL

## 2025-02-16 DIAGNOSIS — L70.0 ACNE VULGARIS: ICD-10-CM

## 2025-02-19 NOTE — TELEPHONE ENCOUNTER
spironolactone (ALDACTONE) 100 MG tablet 180 tablet 3 12/15/2023 -- No   Sig - Route: Take 2 tablets (200 mg) by mouth daily - Oral     Mihaela John PA-C  Dermatology  Lv 12/15/23  csc  Nv  8/15/25      Routed because: labs needed  Diuretics (Including Combos) Protocol Failed    Rerun Protocol (2/16/2025 5:14 PM)    Potassium level on file in past 12 months      Has GFR on file in past 12 months and most recent value is normal      Recent (12 mo) or future (90 days) visit within the authorizing provider's specialty

## 2025-02-24 RX ORDER — SPIRONOLACTONE 100 MG/1
200 TABLET, FILM COATED ORAL DAILY
Qty: 180 TABLET | Refills: 1 | Status: SHIPPED | OUTPATIENT
Start: 2025-02-24

## 2025-04-02 DIAGNOSIS — F34.1 DYSTHYMIA: ICD-10-CM

## 2025-04-02 RX ORDER — ESCITALOPRAM OXALATE 10 MG/1
10 TABLET ORAL DAILY
Qty: 90 TABLET | Refills: 1 | Status: SHIPPED | OUTPATIENT
Start: 2025-04-02

## 2025-05-02 ENCOUNTER — ANCILLARY PROCEDURE (OUTPATIENT)
Dept: MAMMOGRAPHY | Facility: CLINIC | Age: 40
End: 2025-05-02
Attending: FAMILY MEDICINE
Payer: COMMERCIAL

## 2025-05-02 DIAGNOSIS — Z12.31 ENCOUNTER FOR SCREENING MAMMOGRAM FOR BREAST CANCER: ICD-10-CM

## 2025-05-02 PROCEDURE — 77067 SCR MAMMO BI INCL CAD: CPT | Mod: GC | Performed by: RADIOLOGY

## 2025-05-02 PROCEDURE — 77063 BREAST TOMOSYNTHESIS BI: CPT | Mod: GC | Performed by: RADIOLOGY

## 2025-08-14 DIAGNOSIS — L70.0 ACNE VULGARIS: ICD-10-CM

## 2025-08-14 RX ORDER — SPIRONOLACTONE 100 MG/1
200 TABLET, FILM COATED ORAL DAILY
Qty: 180 TABLET | Refills: 1 | OUTPATIENT
Start: 2025-08-14

## (undated) DEVICE — COVER CAMERA IN-LIGHT DISP LT-C02

## (undated) DEVICE — GLOVE PROTEXIS MICRO 6.5  2D73PM65

## (undated) DEVICE — SU VICRYL 8-0 TG140-8DA 12" J548G

## (undated) DEVICE — LINEN TOWEL PACK X5 5464

## (undated) DEVICE — PACK MINOR EYE

## (undated) DEVICE — SYR 03ML SLIP TIP W/O NDL LATEX FREE 309656

## (undated) DEVICE — SYR 10ML SLIP TIP W/O NDL 303134

## (undated) DEVICE — ESU CORD BIPOLAR GREEN 10-4000

## (undated) DEVICE — POSITIONER ARMBOARD FOAM 1PAIR LF FP-ARMB1

## (undated) DEVICE — SU VICRYL 6-0 S-29 12" J556G

## (undated) DEVICE — SOL WATER IRRIG 1000ML BOTTLE 2F7114

## (undated) DEVICE — ESU HOLSTER PLASTIC DISP E2400

## (undated) DEVICE — STRAP KNEE/BODY 31143004

## (undated) DEVICE — EYE PREP BETADINE 5% SOLUTION 30ML 0065-0411-30

## (undated) RX ORDER — PHENYLEPHRINE HCL IN 0.9% NACL 1 MG/10 ML
SYRINGE (ML) INTRAVENOUS
Status: DISPENSED
Start: 2020-01-23

## (undated) RX ORDER — PROPOFOL 10 MG/ML
INJECTION, EMULSION INTRAVENOUS
Status: DISPENSED
Start: 2020-01-23

## (undated) RX ORDER — FENTANYL CITRATE 50 UG/ML
INJECTION, SOLUTION INTRAMUSCULAR; INTRAVENOUS
Status: DISPENSED
Start: 2020-01-23

## (undated) RX ORDER — ACETAMINOPHEN 325 MG/1
TABLET ORAL
Status: DISPENSED
Start: 2020-01-23